# Patient Record
Sex: MALE | Race: WHITE | NOT HISPANIC OR LATINO | Employment: UNEMPLOYED | URBAN - METROPOLITAN AREA
[De-identification: names, ages, dates, MRNs, and addresses within clinical notes are randomized per-mention and may not be internally consistent; named-entity substitution may affect disease eponyms.]

---

## 2018-10-03 ENCOUNTER — HISTORICAL (OUTPATIENT)
Dept: INTERNAL MEDICINE | Facility: CLINIC | Age: 58
End: 2018-10-03

## 2018-10-03 LAB
ABS NEUT (OLG): 5.43 X10(3)/MCL (ref 2.1–9.2)
ALBUMIN SERPL-MCNC: 3.6 GM/DL (ref 3.4–5)
ALBUMIN/GLOB SERPL: 1 RATIO (ref 1–2)
ALP SERPL-CCNC: 57 UNIT/L (ref 45–117)
ALT SERPL-CCNC: 43 UNIT/L (ref 12–78)
APPEARANCE, UA: CLEAR
AST SERPL-CCNC: 35 UNIT/L (ref 15–37)
BACTERIA #/AREA URNS AUTO: ABNORMAL /[HPF]
BASOPHILS # BLD AUTO: 0.05 X10(3)/MCL
BASOPHILS NFR BLD AUTO: 0 %
BILIRUB SERPL-MCNC: 0.8 MG/DL (ref 0.2–1)
BILIRUB UR QL STRIP: NEGATIVE
BILIRUBIN DIRECT+TOT PNL SERPL-MCNC: 0.3 MG/DL
BILIRUBIN DIRECT+TOT PNL SERPL-MCNC: 0.5 MG/DL
BUN SERPL-MCNC: 31 MG/DL (ref 7–18)
CALCIUM SERPL-MCNC: 8.9 MG/DL (ref 8.5–10.1)
CHLORIDE SERPL-SCNC: 100 MMOL/L (ref 98–107)
CHOLEST SERPL-MCNC: 135 MG/DL
CHOLEST/HDLC SERPL: 4.7 {RATIO} (ref 0–5)
CO2 SERPL-SCNC: 23 MMOL/L (ref 21–32)
COLOR UR: YELLOW
CREAT SERPL-MCNC: 1.8 MG/DL (ref 0.6–1.3)
EOSINOPHIL # BLD AUTO: 0.27 X10(3)/MCL
EOSINOPHIL NFR BLD AUTO: 3 %
ERYTHROCYTE [DISTWIDTH] IN BLOOD BY AUTOMATED COUNT: 13.9 % (ref 11.5–14.5)
EST. AVERAGE GLUCOSE BLD GHB EST-MCNC: 105 MG/DL
GLOBULIN SER-MCNC: 5 GM/ML (ref 2.3–3.5)
GLUCOSE (UA): NORMAL
GLUCOSE SERPL-MCNC: 94 MG/DL (ref 74–106)
HAV IGM SERPL QL IA: NONREACTIVE
HBA1C MFR BLD: 5.3 % (ref 4.2–6.3)
HBV CORE IGM SERPL QL IA: NONREACTIVE
HBV SURFACE AG SERPL QL IA: NEGATIVE
HCT VFR BLD AUTO: 35.9 % (ref 40–51)
HCV AB SERPL QL IA: NONREACTIVE
HDLC SERPL-MCNC: 29 MG/DL
HGB BLD-MCNC: 12.5 GM/DL (ref 13.5–17.5)
HGB UR QL STRIP: NEGATIVE
HIV 1+2 AB+HIV1 P24 AG SERPL QL IA: NONREACTIVE
HYALINE CASTS #/AREA URNS LPF: ABNORMAL /[LPF]
IMM GRANULOCYTES # BLD AUTO: 0.13 10*3/UL
IMM GRANULOCYTES NFR BLD AUTO: 1 %
KETONES UR QL STRIP: NEGATIVE
LDLC SERPL CALC-MCNC: 64 MG/DL (ref 0–130)
LEUKOCYTE ESTERASE UR QL STRIP: 75 LEU/UL
LYMPHOCYTES # BLD AUTO: 2.63 X10(3)/MCL
LYMPHOCYTES NFR BLD AUTO: 27 % (ref 13–40)
MCH RBC QN AUTO: 31.4 PG (ref 26–34)
MCHC RBC AUTO-ENTMCNC: 34.8 GM/DL (ref 31–37)
MCV RBC AUTO: 90.2 FL (ref 80–100)
MONOCYTES # BLD AUTO: 1.2 X10(3)/MCL
MONOCYTES NFR BLD AUTO: 12 % (ref 4–12)
NEUTROPHILS # BLD AUTO: 5.43 X10(3)/MCL
NEUTROPHILS NFR BLD AUTO: 56 X10(3)/MCL
NITRITE UR QL STRIP: NEGATIVE
PH UR STRIP: 5.5 [PH] (ref 4.5–8)
PLATELET # BLD AUTO: 131 X10(3)/MCL (ref 130–400)
PMV BLD AUTO: 9.5 FL (ref 7.4–10.4)
POTASSIUM SERPL-SCNC: 4 MMOL/L (ref 3.5–5.1)
PROT SERPL-MCNC: 8.6 GM/DL (ref 6.4–8.2)
PROT UR QL STRIP: 10 MG/DL
PSA SERPL-MCNC: 0.6 NG/ML
RBC # BLD AUTO: 3.98 X10(6)/MCL (ref 4.5–5.9)
RBC #/AREA URNS AUTO: ABNORMAL /[HPF]
SODIUM SERPL-SCNC: 134 MMOL/L (ref 136–145)
SP GR UR STRIP: 1.01 (ref 1–1.03)
SQUAMOUS #/AREA URNS LPF: ABNORMAL /[LPF]
T PALLIDUM AB SER QL: NONREACTIVE
TRIGL SERPL-MCNC: 208 MG/DL
TSH SERPL-ACNC: 1.92 MIU/L (ref 0.36–3.74)
UROBILINOGEN UR STRIP-ACNC: NORMAL
VLDLC SERPL CALC-MCNC: 42 MG/DL
WBC # SPEC AUTO: 9.7 X10(3)/MCL (ref 4.5–11)
WBC #/AREA URNS AUTO: ABNORMAL /HPF

## 2018-10-10 ENCOUNTER — HISTORICAL (OUTPATIENT)
Dept: INTERNAL MEDICINE | Facility: CLINIC | Age: 58
End: 2018-10-10

## 2018-10-10 LAB
APPEARANCE, UA: CLEAR
BACTERIA #/AREA URNS AUTO: ABNORMAL /[HPF]
BILIRUB UR QL STRIP: NEGATIVE
BUN SERPL-MCNC: 22 MG/DL (ref 7–18)
CALCIUM SERPL-MCNC: 9.4 MG/DL (ref 8.5–10.1)
CHLORIDE SERPL-SCNC: 102 MMOL/L (ref 98–107)
CO2 SERPL-SCNC: 26 MMOL/L (ref 21–32)
COLOR UR: YELLOW
CREAT SERPL-MCNC: 0.9 MG/DL (ref 0.6–1.3)
CREAT/UREA NIT SERPL: 24
GLUCOSE (UA): NORMAL
GLUCOSE SERPL-MCNC: 89 MG/DL (ref 74–106)
HGB UR QL STRIP: NEGATIVE
HYALINE CASTS #/AREA URNS LPF: ABNORMAL /[LPF]
KETONES UR QL STRIP: NEGATIVE
LEUKOCYTE ESTERASE UR QL STRIP: 75 LEU/UL
NITRITE UR QL STRIP: NEGATIVE
PH UR STRIP: 6 [PH] (ref 4.5–8)
POTASSIUM SERPL-SCNC: 4 MMOL/L (ref 3.5–5.1)
PROT UR QL STRIP: 10 MG/DL
RBC #/AREA URNS AUTO: ABNORMAL /[HPF]
SODIUM SERPL-SCNC: 136 MMOL/L (ref 136–145)
SP GR UR STRIP: 1.02 (ref 1–1.03)
SQUAMOUS #/AREA URNS LPF: ABNORMAL /[LPF]
UROBILINOGEN UR STRIP-ACNC: NORMAL
WBC #/AREA URNS AUTO: ABNORMAL /HPF

## 2018-10-12 LAB — FINAL CULTURE: NO GROWTH

## 2018-10-26 ENCOUNTER — HISTORICAL (OUTPATIENT)
Dept: INTERNAL MEDICINE | Facility: CLINIC | Age: 58
End: 2018-10-26

## 2018-10-26 LAB
APPEARANCE, UA: CLEAR
BACTERIA #/AREA URNS AUTO: ABNORMAL /[HPF]
BILIRUB UR QL STRIP: NEGATIVE
COLOR UR: YELLOW
GLUCOSE (UA): NORMAL
HGB UR QL STRIP: NEGATIVE
HYALINE CASTS #/AREA URNS LPF: ABNORMAL /[LPF]
KETONES UR QL STRIP: NEGATIVE
LEUKOCYTE ESTERASE UR QL STRIP: 250 LEU/UL
NITRITE UR QL STRIP: NEGATIVE
PH UR STRIP: 6 [PH] (ref 4.5–8)
PROT UR QL STRIP: NEGATIVE
RBC #/AREA URNS AUTO: ABNORMAL /[HPF]
SP GR UR STRIP: 1.02 (ref 1–1.03)
SQUAMOUS #/AREA URNS LPF: ABNORMAL /[LPF]
UROBILINOGEN UR STRIP-ACNC: NORMAL
WBC #/AREA URNS AUTO: ABNORMAL /HPF

## 2018-12-04 ENCOUNTER — HISTORICAL (OUTPATIENT)
Dept: ADMINISTRATIVE | Facility: HOSPITAL | Age: 58
End: 2018-12-04

## 2019-09-27 ENCOUNTER — HISTORICAL (OUTPATIENT)
Dept: INTERNAL MEDICINE | Facility: CLINIC | Age: 59
End: 2019-09-27

## 2019-09-27 LAB
ABS NEUT (OLG): 3.29 X10(3)/MCL (ref 2.1–9.2)
ALBUMIN SERPL-MCNC: 3.9 GM/DL (ref 3.4–5)
ALBUMIN/GLOB SERPL: 0.8 RATIO (ref 1.1–2)
ALP SERPL-CCNC: 54 UNIT/L (ref 45–117)
ALT SERPL-CCNC: 50 UNIT/L (ref 12–78)
APPEARANCE, UA: CLEAR
AST SERPL-CCNC: 41 UNIT/L (ref 15–37)
BACTERIA #/AREA URNS AUTO: ABNORMAL /[HPF]
BASOPHILS # BLD AUTO: 0 X10(3)/MCL (ref 0–0.2)
BASOPHILS NFR BLD AUTO: 0 %
BILIRUB SERPL-MCNC: 0.4 MG/DL (ref 0.2–1)
BILIRUB UR QL STRIP: NEGATIVE
BILIRUBIN DIRECT+TOT PNL SERPL-MCNC: 0.2 MG/DL
BILIRUBIN DIRECT+TOT PNL SERPL-MCNC: 0.2 MG/DL (ref 0–0.2)
BUN SERPL-MCNC: 18 MG/DL (ref 7–18)
CALCIUM SERPL-MCNC: 8.8 MG/DL (ref 8.5–10.1)
CHLORIDE SERPL-SCNC: 109 MMOL/L (ref 98–107)
CHOLEST SERPL-MCNC: 183 MG/DL
CHOLEST/HDLC SERPL: 4.4 {RATIO} (ref 0–5)
CO2 SERPL-SCNC: 25 MMOL/L (ref 21–32)
COLOR UR: ABNORMAL
CREAT SERPL-MCNC: 0.7 MG/DL (ref 0.6–1.3)
EOSINOPHIL # BLD AUTO: 0.1 X10(3)/MCL (ref 0–0.9)
EOSINOPHIL NFR BLD AUTO: 2 %
ERYTHROCYTE [DISTWIDTH] IN BLOOD BY AUTOMATED COUNT: 13.2 % (ref 11.5–14.5)
EST. AVERAGE GLUCOSE BLD GHB EST-MCNC: 128 MG/DL
GLOBULIN SER-MCNC: 4.7 GM/ML (ref 2.3–3.5)
GLUCOSE (UA): NORMAL
GLUCOSE SERPL-MCNC: 92 MG/DL (ref 74–106)
HAV IGM SERPL QL IA: NONREACTIVE
HBA1C MFR BLD: 6.1 % (ref 4.2–6.3)
HBV CORE IGM SERPL QL IA: NONREACTIVE
HBV SURFACE AG SERPL QL IA: NEGATIVE
HCT VFR BLD AUTO: 40.4 % (ref 40–51)
HCV AB SERPL QL IA: NONREACTIVE
HDLC SERPL-MCNC: 42 MG/DL (ref 40–59)
HGB BLD-MCNC: 13.9 GM/DL (ref 13.5–17.5)
HGB UR QL STRIP: 0.03 MG/DL
HIV 1+2 AB+HIV1 P24 AG SERPL QL IA: NONREACTIVE
HYALINE CASTS #/AREA URNS LPF: ABNORMAL /[LPF]
IMM GRANULOCYTES # BLD AUTO: 0.09 10*3/UL
IMM GRANULOCYTES NFR BLD AUTO: 2 %
KETONES UR QL STRIP: NEGATIVE
LDLC SERPL CALC-MCNC: 119 MG/DL
LEUKOCYTE ESTERASE UR QL STRIP: 250 LEU/UL
LYMPHOCYTES # BLD AUTO: 1.9 X10(3)/MCL (ref 0.6–4.6)
LYMPHOCYTES NFR BLD AUTO: 31 %
MCH RBC QN AUTO: 32 PG (ref 26–34)
MCHC RBC AUTO-ENTMCNC: 34.4 GM/DL (ref 31–37)
MCV RBC AUTO: 93.1 FL (ref 80–100)
MONOCYTES # BLD AUTO: 0.6 X10(3)/MCL (ref 0.1–1.3)
MONOCYTES NFR BLD AUTO: 10 %
NEUTROPHILS # BLD AUTO: 3.29 X10(3)/MCL (ref 2.1–9.2)
NEUTROPHILS NFR BLD AUTO: 54 %
NITRITE UR QL STRIP: NEGATIVE
PH UR STRIP: 5.5 [PH] (ref 4.5–8)
PLATELET # BLD AUTO: 119 X10(3)/MCL (ref 130–400)
PMV BLD AUTO: 9.1 FL (ref 7.4–10.4)
POTASSIUM SERPL-SCNC: 4.1 MMOL/L (ref 3.5–5.1)
PROT SERPL-MCNC: 8.6 GM/DL (ref 6.4–8.2)
PROT UR QL STRIP: 10 MG/DL
PSA SERPL-MCNC: 0.7 NG/ML
RBC # BLD AUTO: 4.34 X10(6)/MCL (ref 4.5–5.9)
RBC #/AREA URNS AUTO: ABNORMAL /[HPF]
SODIUM SERPL-SCNC: 139 MMOL/L (ref 136–145)
SP GR UR STRIP: 1.02 (ref 1–1.03)
SQUAMOUS #/AREA URNS LPF: ABNORMAL /[LPF]
T PALLIDUM AB SER QL: NONREACTIVE
TRIGL SERPL-MCNC: 111 MG/DL
TSH SERPL-ACNC: 1.07 MIU/L (ref 0.36–3.74)
UROBILINOGEN UR STRIP-ACNC: NORMAL
VLDLC SERPL CALC-MCNC: 22 MG/DL
WBC # SPEC AUTO: 6 X10(3)/MCL (ref 4.5–11)
WBC #/AREA URNS AUTO: ABNORMAL /HPF

## 2019-09-29 LAB — FINAL CULTURE: NO GROWTH

## 2019-10-16 ENCOUNTER — HISTORICAL (OUTPATIENT)
Dept: RADIOLOGY | Facility: HOSPITAL | Age: 59
End: 2019-10-16

## 2019-11-01 ENCOUNTER — HISTORICAL (OUTPATIENT)
Dept: ENDOSCOPY | Facility: HOSPITAL | Age: 59
End: 2019-11-01

## 2020-01-08 ENCOUNTER — HISTORICAL (OUTPATIENT)
Dept: ADMINISTRATIVE | Facility: HOSPITAL | Age: 60
End: 2020-01-08

## 2020-01-08 LAB
ABS NEUT (OLG): 5.07 X10(3)/MCL (ref 2.1–9.2)
ALBUMIN SERPL-MCNC: 4 GM/DL (ref 3.4–5)
ALBUMIN/GLOB SERPL: 0.8 RATIO (ref 1.1–2)
ALP SERPL-CCNC: 43 UNIT/L (ref 45–117)
ALT SERPL-CCNC: 61 UNIT/L (ref 12–78)
APTT PPP: 31 SECOND(S) (ref 23.3–37)
AST SERPL-CCNC: 36 UNIT/L (ref 15–37)
BASOPHILS # BLD AUTO: 0 X10(3)/MCL (ref 0–0.2)
BASOPHILS NFR BLD AUTO: 0 %
BILIRUB SERPL-MCNC: 0.6 MG/DL (ref 0.2–1)
BILIRUBIN DIRECT+TOT PNL SERPL-MCNC: 0.2 MG/DL (ref 0–0.2)
BILIRUBIN DIRECT+TOT PNL SERPL-MCNC: 0.4 MG/DL
BUN SERPL-MCNC: 25 MG/DL (ref 7–18)
CALCIUM SERPL-MCNC: 9.6 MG/DL (ref 8.5–10.1)
CHLORIDE SERPL-SCNC: 103 MMOL/L (ref 98–107)
CO2 SERPL-SCNC: 26 MMOL/L (ref 21–32)
CREAT SERPL-MCNC: 0.8 MG/DL (ref 0.6–1.3)
EOSINOPHIL # BLD AUTO: 0.2 X10(3)/MCL (ref 0–0.9)
EOSINOPHIL NFR BLD AUTO: 2 %
ERYTHROCYTE [DISTWIDTH] IN BLOOD BY AUTOMATED COUNT: 12.7 % (ref 11.5–14.5)
GLOBULIN SER-MCNC: 4.8 GM/ML (ref 2.3–3.5)
GLUCOSE SERPL-MCNC: 99 MG/DL (ref 74–106)
HCT VFR BLD AUTO: 45.8 % (ref 40–51)
HGB BLD-MCNC: 15.4 GM/DL (ref 13.5–17.5)
IMM GRANULOCYTES # BLD AUTO: 0.1 10*3/UL
IMM GRANULOCYTES NFR BLD AUTO: 1 %
INR PPP: 1.01 (ref 0.9–1.2)
LYMPHOCYTES # BLD AUTO: 2.5 X10(3)/MCL (ref 0.6–4.6)
LYMPHOCYTES NFR BLD AUTO: 28 %
MCH RBC QN AUTO: 31.5 PG (ref 26–34)
MCHC RBC AUTO-ENTMCNC: 33.6 GM/DL (ref 31–37)
MCV RBC AUTO: 93.7 FL (ref 80–100)
MONOCYTES # BLD AUTO: 0.8 X10(3)/MCL (ref 0.1–1.3)
MONOCYTES NFR BLD AUTO: 10 %
NEUTROPHILS # BLD AUTO: 5.07 X10(3)/MCL (ref 2.1–9.2)
NEUTROPHILS NFR BLD AUTO: 59 %
PLATELET # BLD AUTO: 112 X10(3)/MCL (ref 130–400)
PMV BLD AUTO: 9.4 FL (ref 7.4–10.4)
POTASSIUM SERPL-SCNC: 3.9 MMOL/L (ref 3.5–5.1)
PROT SERPL-MCNC: 8.8 GM/DL (ref 6.4–8.2)
PROTHROMBIN TIME: 13.2 SECOND(S) (ref 11.9–14.4)
RBC # BLD AUTO: 4.89 X10(6)/MCL (ref 4.5–5.9)
SODIUM SERPL-SCNC: 138 MMOL/L (ref 136–145)
WBC # SPEC AUTO: 8.6 X10(3)/MCL (ref 4.5–11)

## 2020-01-24 ENCOUNTER — HISTORICAL (OUTPATIENT)
Dept: RADIOLOGY | Facility: HOSPITAL | Age: 60
End: 2020-01-24

## 2020-02-18 ENCOUNTER — HISTORICAL (OUTPATIENT)
Dept: ADMINISTRATIVE | Facility: HOSPITAL | Age: 60
End: 2020-02-18

## 2020-02-18 LAB
FERRITIN SERPL-MCNC: 1352.4 NG/ML (ref 26–388)
IRON SATN MFR SERPL: 30.6 % (ref 15–50)
IRON SERPL-MCNC: 87 MCG/DL (ref 65–175)
TIBC SERPL-MCNC: 284 MCG/DL (ref 250–450)
TRANSFERRIN SERPL-MCNC: 245 MG/DL (ref 200–360)

## 2020-02-20 ENCOUNTER — HISTORICAL (OUTPATIENT)
Dept: SURGERY | Facility: HOSPITAL | Age: 60
End: 2020-02-20

## 2020-02-20 LAB
ABS NEUT (OLG): 4.3 X10(3)/MCL (ref 2.1–9.2)
BASOPHILS # BLD AUTO: 0 X10(3)/MCL (ref 0–0.2)
BASOPHILS NFR BLD AUTO: 0 %
EOSINOPHIL # BLD AUTO: 0.1 X10(3)/MCL (ref 0–0.9)
EOSINOPHIL NFR BLD AUTO: 2 %
ERYTHROCYTE [DISTWIDTH] IN BLOOD BY AUTOMATED COUNT: 12.6 % (ref 11.5–14.5)
HCT VFR BLD AUTO: 44.1 % (ref 40–51)
HGB BLD-MCNC: 15.1 GM/DL (ref 13.5–17.5)
IMM GRANULOCYTES # BLD AUTO: 0.05 10*3/UL
IMM GRANULOCYTES NFR BLD AUTO: 1 %
INR PPP: 0.97 (ref 0.9–1.2)
LYMPHOCYTES # BLD AUTO: 1.7 X10(3)/MCL (ref 0.6–4.6)
LYMPHOCYTES NFR BLD AUTO: 24 %
MCH RBC QN AUTO: 31.5 PG (ref 26–34)
MCHC RBC AUTO-ENTMCNC: 34.2 GM/DL (ref 31–37)
MCV RBC AUTO: 91.9 FL (ref 80–100)
MONOCYTES # BLD AUTO: 0.7 X10(3)/MCL (ref 0.1–1.3)
MONOCYTES NFR BLD AUTO: 11 %
NEUTROPHILS # BLD AUTO: 4.3 X10(3)/MCL (ref 2.1–9.2)
NEUTROPHILS NFR BLD AUTO: 62 %
PLATELET # BLD AUTO: 107 X10(3)/MCL (ref 130–400)
PMV BLD AUTO: 9.4 FL (ref 7.4–10.4)
PROTHROMBIN TIME: 12.8 SECOND(S) (ref 11.9–14.4)
RBC # BLD AUTO: 4.8 X10(6)/MCL (ref 4.5–5.9)
WBC # SPEC AUTO: 6.9 X10(3)/MCL (ref 4.5–11)

## 2020-07-29 ENCOUNTER — HISTORICAL (OUTPATIENT)
Dept: ADMINISTRATIVE | Facility: HOSPITAL | Age: 60
End: 2020-07-29

## 2020-07-29 LAB
ABS NEUT (OLG): 5.9 X10(3)/MCL (ref 2.1–9.2)
ALBUMIN SERPL-MCNC: 4 GM/DL (ref 3.4–5)
ALBUMIN/GLOB SERPL: 0.8 RATIO (ref 1.1–2)
ALP SERPL-CCNC: 49 UNIT/L (ref 45–117)
ALT SERPL-CCNC: 45 UNIT/L (ref 12–78)
APTT PPP: 28.6 SECOND(S) (ref 23.3–37)
AST SERPL-CCNC: 31 UNIT/L (ref 15–37)
BASOPHILS # BLD AUTO: 0.1 X10(3)/MCL (ref 0–0.2)
BASOPHILS NFR BLD AUTO: 1 %
BILIRUB SERPL-MCNC: 0.6 MG/DL (ref 0.2–1)
BILIRUBIN DIRECT+TOT PNL SERPL-MCNC: 0.2 MG/DL (ref 0–0.2)
BILIRUBIN DIRECT+TOT PNL SERPL-MCNC: 0.4 MG/DL
BUN SERPL-MCNC: 21 MG/DL (ref 7–18)
CALCIUM SERPL-MCNC: 10.1 MG/DL (ref 8.5–10.1)
CHLORIDE SERPL-SCNC: 105 MMOL/L (ref 98–107)
CO2 SERPL-SCNC: 27 MMOL/L (ref 21–32)
CREAT SERPL-MCNC: 0.7 MG/DL (ref 0.6–1.3)
EOSINOPHIL # BLD AUTO: 0.2 X10(3)/MCL (ref 0–0.9)
EOSINOPHIL NFR BLD AUTO: 2 %
ERYTHROCYTE [DISTWIDTH] IN BLOOD BY AUTOMATED COUNT: 14.1 % (ref 11.5–14.5)
GLOBULIN SER-MCNC: 5.1 GM/ML (ref 2.3–3.5)
GLUCOSE SERPL-MCNC: 96 MG/DL (ref 74–106)
HBV CORE AB SERPL QL IA: NONREACTIVE
HBV SURFACE AB SER-ACNC: 0 M[IU]/ML
HBV SURFACE AB SERPL IA-ACNC: NONREACTIVE M[IU]/ML
HCT VFR BLD AUTO: 48.5 % (ref 40–51)
HGB BLD-MCNC: 16.4 GM/DL (ref 13.5–17.5)
IMM GRANULOCYTES # BLD AUTO: 0.24 10*3/UL
IMM GRANULOCYTES NFR BLD AUTO: 2 %
INR PPP: 1.03 (ref 0.9–1.2)
LYMPHOCYTES # BLD AUTO: 2.4 X10(3)/MCL (ref 0.6–4.6)
LYMPHOCYTES NFR BLD AUTO: 25 %
MCH RBC QN AUTO: 31.1 PG (ref 26–34)
MCHC RBC AUTO-ENTMCNC: 33.8 GM/DL (ref 31–37)
MCV RBC AUTO: 92 FL (ref 80–100)
MONOCYTES # BLD AUTO: 1 X10(3)/MCL (ref 0.1–1.3)
MONOCYTES NFR BLD AUTO: 11 %
NEUTROPHILS # BLD AUTO: 5.9 X10(3)/MCL (ref 2.1–9.2)
NEUTROPHILS NFR BLD AUTO: 60 %
PLATELET # BLD AUTO: 146 X10(3)/MCL (ref 130–400)
PMV BLD AUTO: 9.4 FL (ref 7.4–10.4)
POTASSIUM SERPL-SCNC: 3.9 MMOL/L (ref 3.5–5.1)
PROT SERPL-MCNC: 9.1 GM/DL (ref 6.4–8.2)
PROTHROMBIN TIME: 13.1 SECOND(S) (ref 11.9–14.4)
RBC # BLD AUTO: 5.27 X10(6)/MCL (ref 4.5–5.9)
SODIUM SERPL-SCNC: 138 MMOL/L (ref 136–145)
WBC # SPEC AUTO: 9.9 X10(3)/MCL (ref 4.5–11)

## 2020-08-24 ENCOUNTER — HISTORICAL (OUTPATIENT)
Dept: RADIOLOGY | Facility: HOSPITAL | Age: 60
End: 2020-08-24

## 2020-10-06 ENCOUNTER — HISTORICAL (OUTPATIENT)
Dept: INTERNAL MEDICINE | Facility: CLINIC | Age: 60
End: 2020-10-06

## 2020-10-06 LAB
ABS NEUT (OLG): 5.2 X10(3)/MCL (ref 2.1–9.2)
ALBUMIN SERPL-MCNC: 3.8 GM/DL (ref 3.4–5)
ALBUMIN/GLOB SERPL: 0.8 RATIO (ref 1.1–2)
ALP SERPL-CCNC: 62 UNIT/L (ref 45–117)
ALT SERPL-CCNC: 51 UNIT/L (ref 12–78)
APPEARANCE, UA: CLEAR
AST SERPL-CCNC: 29 UNIT/L (ref 15–37)
BACTERIA #/AREA URNS AUTO: ABNORMAL /HPF
BASOPHILS # BLD AUTO: 0 X10(3)/MCL (ref 0–0.2)
BASOPHILS NFR BLD AUTO: 0 %
BILIRUB SERPL-MCNC: 0.5 MG/DL (ref 0.2–1)
BILIRUB UR QL STRIP: NEGATIVE
BILIRUBIN DIRECT+TOT PNL SERPL-MCNC: 0.2 MG/DL (ref 0–0.2)
BILIRUBIN DIRECT+TOT PNL SERPL-MCNC: 0.3 MG/DL
BUN SERPL-MCNC: 18 MG/DL (ref 7–18)
CALCIUM SERPL-MCNC: 9.2 MG/DL (ref 8.5–10.1)
CHLORIDE SERPL-SCNC: 104 MMOL/L (ref 98–107)
CHOLEST SERPL-MCNC: 178 MG/DL
CHOLEST/HDLC SERPL: 4.3 {RATIO} (ref 0–5)
CO2 SERPL-SCNC: 28 MMOL/L (ref 21–32)
COLOR UR: ABNORMAL
CREAT SERPL-MCNC: 0.8 MG/DL (ref 0.6–1.3)
EOSINOPHIL # BLD AUTO: 0.2 X10(3)/MCL (ref 0–0.9)
EOSINOPHIL NFR BLD AUTO: 2 %
ERYTHROCYTE [DISTWIDTH] IN BLOOD BY AUTOMATED COUNT: 13.3 % (ref 11.5–14.5)
EST. AVERAGE GLUCOSE BLD GHB EST-MCNC: 103 MG/DL
GLOBULIN SER-MCNC: 4.7 GM/ML (ref 2.3–3.5)
GLUCOSE (UA): NEGATIVE
GLUCOSE SERPL-MCNC: 94 MG/DL (ref 74–106)
HBA1C MFR BLD: 5.2 % (ref 4.2–6.3)
HCT VFR BLD AUTO: 44.3 % (ref 40–51)
HDLC SERPL-MCNC: 41 MG/DL (ref 40–59)
HGB BLD-MCNC: 15.2 GM/DL (ref 13.5–17.5)
HGB UR QL STRIP: NEGATIVE
HYALINE CASTS #/AREA URNS LPF: ABNORMAL /LPF
IMM GRANULOCYTES # BLD AUTO: 0.11 10*3/UL
IMM GRANULOCYTES NFR BLD AUTO: 1 %
KETONES UR QL STRIP: NEGATIVE
LDLC SERPL CALC-MCNC: 93 MG/DL
LEUKOCYTE ESTERASE UR QL STRIP: 75 LEU/UL
LYMPHOCYTES # BLD AUTO: 1.8 X10(3)/MCL (ref 0.6–4.6)
LYMPHOCYTES NFR BLD AUTO: 21 %
MCH RBC QN AUTO: 31.8 PG (ref 26–34)
MCHC RBC AUTO-ENTMCNC: 34.3 GM/DL (ref 31–37)
MCV RBC AUTO: 92.7 FL (ref 80–100)
MONOCYTES # BLD AUTO: 1 X10(3)/MCL (ref 0.1–1.3)
MONOCYTES NFR BLD AUTO: 12 %
NEUTROPHILS # BLD AUTO: 5.2 X10(3)/MCL (ref 2.1–9.2)
NEUTROPHILS NFR BLD AUTO: 63 %
NITRITE UR QL STRIP: NEGATIVE
PH UR STRIP: 6 [PH] (ref 4.5–8)
PLATELET # BLD AUTO: 134 X10(3)/MCL (ref 130–400)
PMV BLD AUTO: 9.6 FL (ref 7.4–10.4)
POTASSIUM SERPL-SCNC: 4 MMOL/L (ref 3.5–5.1)
PROT SERPL-MCNC: 8.5 GM/DL (ref 6.4–8.2)
PROT UR QL STRIP: 10 MG/DL
PSA SERPL-MCNC: 0.5 NG/ML
RBC # BLD AUTO: 4.78 X10(6)/MCL (ref 4.5–5.9)
RBC #/AREA URNS AUTO: ABNORMAL /HPF
SODIUM SERPL-SCNC: 139 MMOL/L (ref 136–145)
SP GR UR STRIP: 1.02 (ref 1–1.03)
SQUAMOUS #/AREA URNS LPF: ABNORMAL /LPF
TRIGL SERPL-MCNC: 219 MG/DL
TSH SERPL-ACNC: 2.3 MIU/L (ref 0.36–3.74)
UROBILINOGEN UR STRIP-ACNC: NORMAL
VLDLC SERPL CALC-MCNC: 44 MG/DL
WBC # SPEC AUTO: 8.2 X10(3)/MCL (ref 4.5–11)
WBC #/AREA URNS AUTO: ABNORMAL /HPF

## 2020-10-08 LAB — FINAL CULTURE: NO GROWTH

## 2021-01-12 ENCOUNTER — HISTORICAL (OUTPATIENT)
Dept: RADIOLOGY | Facility: HOSPITAL | Age: 61
End: 2021-01-12

## 2021-01-21 ENCOUNTER — HISTORICAL (OUTPATIENT)
Dept: GASTROENTEROLOGY | Facility: CLINIC | Age: 61
End: 2021-01-21

## 2021-04-14 ENCOUNTER — HISTORICAL (OUTPATIENT)
Dept: INTERNAL MEDICINE | Facility: CLINIC | Age: 61
End: 2021-04-14

## 2021-04-14 LAB
ALBUMIN SERPL-MCNC: 4 GM/DL (ref 3.4–4.8)
ALBUMIN/GLOB SERPL: 0.8 RATIO (ref 1.1–2)
ALP SERPL-CCNC: 70 UNIT/L (ref 40–150)
ALT SERPL-CCNC: 61 UNIT/L (ref 0–55)
AST SERPL-CCNC: 34 UNIT/L (ref 5–34)
BILIRUB SERPL-MCNC: 0.5 MG/DL
BILIRUBIN DIRECT+TOT PNL SERPL-MCNC: 0.2 MG/DL (ref 0–0.8)
BILIRUBIN DIRECT+TOT PNL SERPL-MCNC: 0.3 MG/DL (ref 0–0.5)
BUN SERPL-MCNC: 19.8 MG/DL (ref 8.4–25.7)
CALCIUM SERPL-MCNC: 9.9 MG/DL (ref 8.8–10)
CHLORIDE SERPL-SCNC: 104 MMOL/L (ref 98–107)
CHOLEST SERPL-MCNC: 174 MG/DL
CHOLEST/HDLC SERPL: 6 {RATIO} (ref 0–5)
CO2 SERPL-SCNC: 24 MMOL/L (ref 23–31)
CREAT SERPL-MCNC: 0.79 MG/DL (ref 0.73–1.18)
GLOBULIN SER-MCNC: 5.3 GM/DL (ref 2.4–3.5)
GLUCOSE SERPL-MCNC: 95 MG/DL (ref 82–115)
HDLC SERPL-MCNC: 31 MG/DL (ref 35–60)
LDLC SERPL CALC-MCNC: 99 MG/DL (ref 50–140)
POTASSIUM SERPL-SCNC: 4 MMOL/L (ref 3.5–5.1)
PROT SERPL-MCNC: 9.3 GM/DL (ref 5.8–7.6)
SODIUM SERPL-SCNC: 138 MMOL/L (ref 136–145)
TRIGL SERPL-MCNC: 219 MG/DL (ref 34–140)
VLDLC SERPL CALC-MCNC: 44 MG/DL

## 2021-06-16 ENCOUNTER — HISTORICAL (OUTPATIENT)
Dept: ENDOSCOPY | Facility: HOSPITAL | Age: 61
End: 2021-06-16

## 2021-06-29 ENCOUNTER — HISTORICAL (OUTPATIENT)
Dept: RADIOLOGY | Facility: HOSPITAL | Age: 61
End: 2021-06-29

## 2021-10-14 ENCOUNTER — HISTORICAL (OUTPATIENT)
Dept: INTERNAL MEDICINE | Facility: CLINIC | Age: 61
End: 2021-10-14

## 2021-10-14 LAB
ABS NEUT (OLG): 3.8 X10(3)/MCL (ref 2.1–9.2)
ALBUMIN SERPL-MCNC: 3.8 GM/DL (ref 3.4–4.8)
ALBUMIN/GLOB SERPL: 0.9 RATIO (ref 1.1–2)
ALP SERPL-CCNC: 61 UNIT/L (ref 40–150)
ALT SERPL-CCNC: 40 UNIT/L (ref 0–55)
APPEARANCE, UA: CLEAR
AST SERPL-CCNC: 32 UNIT/L (ref 5–34)
BACTERIA #/AREA URNS AUTO: ABNORMAL /HPF
BASOPHILS # BLD AUTO: 0.1 X10(3)/MCL (ref 0–0.2)
BASOPHILS NFR BLD AUTO: 1 %
BILIRUB SERPL-MCNC: 0.4 MG/DL
BILIRUB UR QL STRIP: NEGATIVE
BILIRUBIN DIRECT+TOT PNL SERPL-MCNC: 0.1 MG/DL (ref 0–0.5)
BILIRUBIN DIRECT+TOT PNL SERPL-MCNC: 0.3 MG/DL (ref 0–0.8)
BUN SERPL-MCNC: 18.5 MG/DL (ref 8.4–25.7)
CALCIUM SERPL-MCNC: 9.7 MG/DL (ref 8.8–10)
CHLORIDE SERPL-SCNC: 106 MMOL/L (ref 98–107)
CHOLEST SERPL-MCNC: 193 MG/DL
CHOLEST/HDLC SERPL: 7 {RATIO} (ref 0–5)
CO2 SERPL-SCNC: 23 MMOL/L (ref 23–31)
COLOR UR: ABNORMAL
CREAT SERPL-MCNC: 0.73 MG/DL (ref 0.73–1.18)
EOSINOPHIL # BLD AUTO: 1 X10(3)/MCL (ref 0–0.9)
EOSINOPHIL NFR BLD AUTO: 12 %
ERYTHROCYTE [DISTWIDTH] IN BLOOD BY AUTOMATED COUNT: 13.1 % (ref 11.5–14.5)
EST. AVERAGE GLUCOSE BLD GHB EST-MCNC: 93.9 MG/DL
GLOBULIN SER-MCNC: 4.2 GM/DL (ref 2.4–3.5)
GLUCOSE (UA): NEGATIVE
GLUCOSE SERPL-MCNC: 107 MG/DL (ref 82–115)
HBA1C MFR BLD: 4.9 %
HCT VFR BLD AUTO: 45.5 % (ref 40–51)
HDLC SERPL-MCNC: 29 MG/DL (ref 35–60)
HGB BLD-MCNC: 15.7 GM/DL (ref 13.5–17.5)
HGB UR QL STRIP: 0.03 MG/DL
HYALINE CASTS #/AREA URNS LPF: ABNORMAL /LPF
IMM GRANULOCYTES # BLD AUTO: 0.14 10*3/UL
IMM GRANULOCYTES NFR BLD AUTO: 2 %
KETONES UR QL STRIP: NEGATIVE
LDLC SERPL CALC-MCNC: 102 MG/DL (ref 50–140)
LEUKOCYTE ESTERASE UR QL STRIP: 250 LEU/UL
LYMPHOCYTES # BLD AUTO: 2.4 X10(3)/MCL (ref 0.6–4.6)
LYMPHOCYTES NFR BLD AUTO: 29 %
MCH RBC QN AUTO: 32.2 PG (ref 26–34)
MCHC RBC AUTO-ENTMCNC: 34.5 GM/DL (ref 31–37)
MCV RBC AUTO: 93.4 FL (ref 80–100)
MONOCYTES # BLD AUTO: 0.9 X10(3)/MCL (ref 0.1–1.3)
MONOCYTES NFR BLD AUTO: 11 %
NEUTROPHILS # BLD AUTO: 3.8 X10(3)/MCL (ref 2.1–9.2)
NEUTROPHILS NFR BLD AUTO: 46 %
NITRITE UR QL STRIP: NEGATIVE
NRBC BLD AUTO-RTO: 0 % (ref 0–0.2)
PH UR STRIP: 6.5 [PH] (ref 4.5–8)
PLATELET # BLD AUTO: 127 X10(3)/MCL (ref 130–400)
PMV BLD AUTO: 9.3 FL (ref 7.4–10.4)
POTASSIUM SERPL-SCNC: 3.7 MMOL/L (ref 3.5–5.1)
PROT SERPL-MCNC: 8 GM/DL (ref 5.8–7.6)
PROT UR QL STRIP: NEGATIVE
PSA SERPL-MCNC: 0.68 NG/ML
RBC # BLD AUTO: 4.87 X10(6)/MCL (ref 4.5–5.9)
RBC #/AREA URNS AUTO: ABNORMAL /HPF
SODIUM SERPL-SCNC: 137 MMOL/L (ref 136–145)
SP GR UR STRIP: 1.01 (ref 1–1.03)
SQUAMOUS #/AREA URNS LPF: ABNORMAL /LPF
TRIGL SERPL-MCNC: 311 MG/DL (ref 34–140)
TSH SERPL-ACNC: 2.36 UIU/ML (ref 0.35–4.94)
UROBILINOGEN UR STRIP-ACNC: NORMAL
VLDLC SERPL CALC-MCNC: 62 MG/DL
WBC # SPEC AUTO: 8.2 X10(3)/MCL (ref 4.5–11)
WBC #/AREA URNS AUTO: ABNORMAL /HPF

## 2021-10-16 LAB — FINAL CULTURE: NO GROWTH

## 2021-11-22 ENCOUNTER — HISTORICAL (OUTPATIENT)
Dept: ADMINISTRATIVE | Facility: HOSPITAL | Age: 61
End: 2021-11-22

## 2021-11-22 LAB
INR PPP: 1.06 (ref 0.9–1.2)
PROTHROMBIN TIME: 13.7 SECOND(S) (ref 11.9–14.4)

## 2022-02-23 ENCOUNTER — HISTORICAL (OUTPATIENT)
Dept: ADMINISTRATIVE | Facility: HOSPITAL | Age: 62
End: 2022-02-23

## 2022-02-23 ENCOUNTER — HISTORICAL (OUTPATIENT)
Dept: RADIOLOGY | Facility: HOSPITAL | Age: 62
End: 2022-02-23

## 2022-03-17 ENCOUNTER — HISTORICAL (OUTPATIENT)
Dept: ADMINISTRATIVE | Facility: HOSPITAL | Age: 62
End: 2022-03-17

## 2022-03-17 ENCOUNTER — HISTORICAL (OUTPATIENT)
Dept: RADIOLOGY | Facility: HOSPITAL | Age: 62
End: 2022-03-17

## 2022-03-31 ENCOUNTER — HISTORICAL (OUTPATIENT)
Dept: INTERNAL MEDICINE | Facility: CLINIC | Age: 62
End: 2022-03-31

## 2022-03-31 LAB
ABS NEUT (OLG): 5.56 (ref 2.1–9.2)
ALBUMIN SERPL-MCNC: 4 G/DL (ref 3.4–4.8)
ALBUMIN/GLOB SERPL: 0.9 {RATIO} (ref 1.1–2)
ALP SERPL-CCNC: 44 U/L (ref 40–150)
ALT SERPL-CCNC: 33 U/L (ref 0–55)
AST SERPL-CCNC: 23 U/L (ref 5–34)
BASOPHILS # BLD AUTO: 0 10*3/UL (ref 0–0.2)
BASOPHILS NFR BLD AUTO: 0 %
BILIRUB SERPL-MCNC: 0.7 MG/DL
BILIRUBIN DIRECT+TOT PNL SERPL-MCNC: 0.3 (ref 0–0.5)
BILIRUBIN DIRECT+TOT PNL SERPL-MCNC: 0.4 (ref 0–0.8)
BUN SERPL-MCNC: 16.7 MG/DL (ref 8.4–25.7)
CALCIUM SERPL-MCNC: 10.2 MG/DL (ref 8.7–10.5)
CHLORIDE SERPL-SCNC: 106 MMOL/L (ref 98–107)
CHOLEST SERPL-MCNC: 177 MG/DL
CHOLEST/HDLC SERPL: 6 {RATIO} (ref 0–5)
CO2 SERPL-SCNC: 23 MMOL/L (ref 23–31)
CREAT SERPL-MCNC: 0.72 MG/DL (ref 0.73–1.18)
EOSINOPHIL # BLD AUTO: 0.3 10*3/UL (ref 0–0.9)
EOSINOPHIL NFR BLD AUTO: 3 %
ERYTHROCYTE [DISTWIDTH] IN BLOOD BY AUTOMATED COUNT: 14.1 % (ref 11.5–14.5)
FLAG2 (OHS): 70
FLAG3 (OHS): 90
FLAGS (OHS): 80
GLOBULIN SER-MCNC: 4.6 G/DL (ref 2.4–3.5)
GLUCOSE SERPL-MCNC: 106 MG/DL (ref 82–115)
HCT VFR BLD AUTO: 45.7 % (ref 40–51)
HDLC SERPL-MCNC: 30 MG/DL (ref 35–60)
HGB BLD-MCNC: 15.6 G/DL (ref 13.5–17.5)
ICTERIC INTERF INDEX SERPL-ACNC: 1
IMM GRANULOCYTES # BLD AUTO: 0.16 10*3/UL
IMM GRANULOCYTES NFR BLD AUTO: 2 %
IMM. NE 1 SUSPECT FLAG (OHS): 10
LDLC SERPL CALC-MCNC: 117 MG/DL (ref 50–140)
LIPEMIC INTERF INDEX SERPL-ACNC: 6
LOW EVENT # SUSPECT FLAG (OHS): 80
LYMPHOCYTES # BLD AUTO: 2.5 10*3/UL (ref 0.6–4.6)
LYMPHOCYTES NFR BLD AUTO: 27 %
MANUAL DIFF? (OHS): NO
MCH RBC QN AUTO: 31.2 PG (ref 26–34)
MCHC RBC AUTO-ENTMCNC: 34.1 G/DL (ref 31–37)
MCV RBC AUTO: 91.4 FL (ref 80–100)
MO BLASTS SUSPECT FLAG (OHS): 40
MONOCYTES # BLD AUTO: 0.8 10*3/UL (ref 0.1–1.3)
MONOCYTES NFR BLD AUTO: 9 %
NEUTROPHILS # BLD AUTO: 5.56 10*3/UL (ref 2.1–9.2)
NEUTROPHILS NFR BLD AUTO: 59 %
NRBC BLD AUTO-RTO: 0 % (ref 0–0.2)
PLATELET # BLD AUTO: 128 10*3/UL (ref 130–400)
PMV BLD AUTO: 9.8 FL (ref 7.4–10.4)
POTASSIUM SERPL-SCNC: 3.9 MMOL/L (ref 3.5–5.1)
PROT SERPL-MCNC: 8.6 G/DL (ref 5.8–7.6)
RBC # BLD AUTO: 5 10*6/UL (ref 4.5–5.9)
SODIUM SERPL-SCNC: 138 MMOL/L (ref 136–145)
TRIGL SERPL-MCNC: 150 MG/DL (ref 34–140)
VLDLC SERPL CALC-MCNC: 30 MG/DL
WBC # SPEC AUTO: 9.4 10*3/UL (ref 4.5–11)

## 2022-04-11 ENCOUNTER — HISTORICAL (OUTPATIENT)
Dept: ADMINISTRATIVE | Facility: HOSPITAL | Age: 62
End: 2022-04-11

## 2022-04-25 VITALS
HEIGHT: 67 IN | BODY MASS INDEX: 33.19 KG/M2 | OXYGEN SATURATION: 98 % | SYSTOLIC BLOOD PRESSURE: 122 MMHG | DIASTOLIC BLOOD PRESSURE: 82 MMHG | WEIGHT: 211.44 LBS

## 2022-04-29 ENCOUNTER — HISTORICAL (OUTPATIENT)
Dept: RADIOLOGY | Facility: HOSPITAL | Age: 62
End: 2022-04-29

## 2022-05-03 ENCOUNTER — OFFICE VISIT (OUTPATIENT)
Dept: VASCULAR SURGERY | Facility: CLINIC | Age: 62
End: 2022-05-03
Payer: MEDICAID

## 2022-05-03 VITALS
TEMPERATURE: 98 F | DIASTOLIC BLOOD PRESSURE: 88 MMHG | WEIGHT: 209.69 LBS | HEIGHT: 67 IN | HEART RATE: 94 BPM | SYSTOLIC BLOOD PRESSURE: 142 MMHG | OXYGEN SATURATION: 98 % | BODY MASS INDEX: 32.91 KG/M2 | RESPIRATION RATE: 18 BRPM

## 2022-05-03 DIAGNOSIS — I72.2 RENAL ARTERY ANEURYSM: Primary | ICD-10-CM

## 2022-05-03 DIAGNOSIS — F17.200 NEEDS SMOKING CESSATION EDUCATION: ICD-10-CM

## 2022-05-03 PROCEDURE — 99213 OFFICE O/P EST LOW 20 MIN: CPT | Mod: PBBFAC

## 2022-05-03 RX ORDER — LISINOPRIL AND HYDROCHLOROTHIAZIDE 20; 25 MG/1; MG/1
1 TABLET ORAL DAILY
COMMUNITY
Start: 2021-10-14 | End: 2022-09-26 | Stop reason: SDUPTHER

## 2022-05-03 RX ORDER — MULTIVITAMIN WITH IRON
1 TABLET ORAL DAILY
COMMUNITY
Start: 2021-10-14

## 2022-05-03 NOTE — PROGRESS NOTES
[facility]  Follow Up Appointment    62 yo M who presents for routine surveillance of a L renal artery aneurysm. Aneurysm found incidentally on a CT performed in 2019 performed for evaluation hematuria. Has always been asymptomatic. Patient is still smoking, but has been able to cut back to 10 cigarettes daily.     BP: (!) 142/88   Pulse: 94   Resp: 18   Temp: 98.1 °F (36.7 °C)      NAD  RRR  Non-labored breathing, BRIJESH  S, ND, NT    Renal US (3/17/22): Incidentally visualized small splenule. Aneurysm of L main renal artery measuring 1.8 cm x 1.7 cm x 1.8 cm with diffuse echogenic calcification. Borderline/mild proximal R and distal L renal artery stenosis. Celiac trunk and SMA velocity elevation suggestive of stenosis.    62 yo M w/ asymptomatic L renal artery aneurysm.   - Imaging last month demonstrates stability of aneurysm (1.8 cm from 1.7 cm in 2021).  - No indication for surgical intervention in this patient.   - Continue routine surveillance. Repeat US and RTC in 1 year.  - Educated patient on smoking as strongest risk factor for aneurysm expansion and rupture and encouraged ongoing smoking cessation.

## 2022-05-05 NOTE — HISTORICAL [facility]ERNER
This is a historical note converted from [employer]. Formatting and pictures may have been removed.  Please reference [employer] for original formatting and attached multimedia. Procedure Name  Colonoscopy  Indication  58 yo male presenting for screening colonoscopy after having a +FOBT  Pre-Procedure Exam  General: NAD  Neuro: AOx3, speech intact  HEENT: NCAT  Heart: regular rate, extremities well perfused  Lungs: unlabored breathing on room air  Abd: s/nt/nd, no surgical scars  Ext: no edema, 2+ radial pulse b/l  Skin: no?rashes present  Procedural Sedation  moderate  Technique  Written consents were obtained. The patient was taken back to the procedure room by anesthesia. Propofol was administered for procedural sedation. A digital rectal exam was performed, there was normal resting tone and no blood. The colonoscope was introduced into the anus and advanced under direct vision to the?cecum. A large polyp (3 cm?diameter)?was found at the proximal sigmoid colon,?approx..?35 cm from the anal verge,?this was hot snared and sent for permanent?pathology.?Two smaller polyps were?also hot snared at 20-25 cm from the anal verge?and sent for permanent pathology. A retroflexed view of the anal canal was performed?without?noting additional pathology. The air was evacuated from the rectum and the scope was withdrawn. The patient tolerated the procedure well.  Post-Procedure Exam  General: NAD  Neuro: AOx3, speech intact  HEENT: NCAT  Heart: regular rate, extremities well perfused  Lungs: unlabored breathing on room air  Abd: s/nt/nd, no surgical scars  Ext: no edema, 2+ radial pulse b/l  Skin: no?rashes present  Complications  none  Assessment/Plan  colon polyp ?  - Repeat colonoscopy in 3 months  - Will contact patient regarding pathology if indicated

## 2022-05-05 NOTE — HISTORICAL [facility] C[facility]ER
This is a historical note converted from [employer]. Formatting and pictures may have been removed.  Please reference [facility] for original formatting and attached multimedia. Reason for Consultation  screening colonoscopy  History of Present Illness  59 year old male with a history of HTN?presenting for colonoscopy. Has been told he has microscopic blood in stool and he says he sometimes he has blood when wiping. No family history of CRC.  Tolerated bowel prep.  Review of Systems  Review of systems negative?for?CP, SOB, f/c, n/v  Physical Exam  Vitals & Measurements  T:?36.5? ?C (Oral)? HR:?74(Monitored)? RR:?18? BP:?110/73? SpO2:?99%? WT:?88.6?kg?  General: NAD  Neuro: AOx3, speech intact  HEENT: NCAT  Heart: regular rate, extremities well perfused  Lungs: unlabored breathing on room air  Abd: s/nt/nd, no surgical scars  Ext: no edema, 2+ radial pulse b/l  Skin: no?rashes present  ?  Assessment/Plan  60 yo male presenting for colonoscopy  - proceed to GI lab, consents obtained. dd  ?   Problem List/Past Medical History  Ongoing  Elevated liver function tests  Hypertension  Microhematuria  Obesity  Positive FIT (fecal immunochemical test)  Prediabetes  Tobacco user  Wellness examination  Historical  No qualifying data  Procedure/Surgical History  Repair Right Lower Arm Skin, External Approach (08/23/2018)  Simple repair of superficial wounds of scalp, neck, axillae, external genitalia, trunk and/or extremities (including hands and feet); 2.6 cm to 7.5 cm (08/23/2018)  Tendon repair right foot   Medications  Inpatient  buffered lidocaine 2% - 0.5 ml syringe, 10 mg= 0.5 mL, Subcutaneous, As Directed  hydrALAZINE (Apresoline) Inj., 20 mg= 1 mL, IV, Once  IVF Lactated Ringers LR Infusion 1,000 mL, 1000 mL, IV  Home  hydrochlorothiazide-lisinopril 25 mg-20 mg oral tablet, 1 tab(s), Oral, Daily, 3 refills  Allergies  No Known Medication Allergies  Social History  Abuse/Neglect  No, No, Yes, 11/01/2019  Alcohol  Current,  08/27/2018  Employment/School  Employed, 09/17/2018  Home/Environment  Lives with Alone. Living situation: Home/Independent. Alcohol abuse in household: No. Substance abuse in household: No. Smoker in household: Yes. Injuries/Abuse/Neglect in household: No. Feels unsafe at home: No. Safe place to go: Yes. Agency(s)/Others notified: No. Family/Friends available for support: Yes. Concern for family members at home: No. Major illness in household: No. Financial concerns: No. TV/Computer concerns: No., 09/17/2018  Substance Use  Never, 08/27/2018  Tobacco  10 or more cigarettes (1/2 pack or more)/day in last 30 days, Cigarettes, No, 11/01/2019  Family History  Dementia: Father.  Primary malignant neoplasm of lung: Mother.  Stroke: Father.  Immunizations  Vaccine Date Status   influenza virus vaccine, inactivated 01/31/2018 Recorded

## 2022-05-23 ENCOUNTER — OFFICE VISIT (OUTPATIENT)
Dept: GASTROENTEROLOGY | Facility: CLINIC | Age: 62
End: 2022-05-23
Payer: MEDICAID

## 2022-05-23 VITALS
TEMPERATURE: 98 F | SYSTOLIC BLOOD PRESSURE: 135 MMHG | BODY MASS INDEX: 33.15 KG/M2 | RESPIRATION RATE: 20 BRPM | WEIGHT: 211.19 LBS | HEIGHT: 67 IN | DIASTOLIC BLOOD PRESSURE: 79 MMHG | OXYGEN SATURATION: 98 % | HEART RATE: 75 BPM

## 2022-05-23 DIAGNOSIS — K74.60 LIVER CIRRHOSIS SECONDARY TO NASH (NONALCOHOLIC STEATOHEPATITIS): Primary | ICD-10-CM

## 2022-05-23 DIAGNOSIS — K75.81 LIVER CIRRHOSIS SECONDARY TO NASH (NONALCOHOLIC STEATOHEPATITIS): Primary | ICD-10-CM

## 2022-05-23 PROCEDURE — 99214 OFFICE O/P EST MOD 30 MIN: CPT | Mod: PBBFAC | Performed by: STUDENT IN AN ORGANIZED HEALTH CARE EDUCATION/TRAINING PROGRAM

## 2022-05-23 NOTE — PROGRESS NOTES
Subjective:       Patient ID: Dawson Guerra is a 61 y.o. male.    Chief Complaint: Cirrhosis    61-year-old male with history of hypertension obesity, tobacco use and alcohol abuse presenting here to GI clinic for follow-up appointment for his cirrhosis.  Patient last seen by me November 22, 2021, at that time his cirrhotic liver disease has been well compensated, his MELD score  was 9, and his Child Neff class A. His last EGD performed June of 2021 and head revealed some small esophageal varices grade 1 in the mid and distal esophagus, nonbleeding.  His last abdominal ultrasound was February 2022 that revealed a cirrhotic appearing liver with no focal lesions present.  He has not had any hospitalizations or other ER visits for decompensation of his cirrhosis.  My recommendation at that time had been for him to have repeat EGD in 2023, and he has been avoiding alcohol since that time.      He initially was seen by our clinic after obtaining records from [facility] in January of 2020, his workup in the past has also included a liver biopsy in February of 2020 with findings of cirrhosis with mild steatosis.  The pathology did not elaborate on micro versus macrovesicular steatosis are common or need further findings other than cirrhosis.  The case has been discussed with staff here and was recommended to treat his alcohol plus/minus King cirrhosis.  Has undergone a colonoscopy in the past with Dr. Colunga November of 2019 with findings of 3 adenomatous polyps in the sigmoid colon and recommended recall of 3 years.  He does smoke 10 cigarettes daily and at that time was drinking alcohol on occasion.  He used to drink whiskey, rum and beer on the weekends for several years in the past.  Denied a family history of IBD, colon polyps or colon cancer.    Today's visit:  Denies any complaints today, denies any recent ER visits for decompensation of cirrhosis, denies any jaundice, abdominal swelling, lower extremity  swelling.  Only admits to easy bleeding with scratches or cuts of his upper extremities, I explained him likely result of thrombocytopenia from his cirrhotic liver disease.  He continues to avoid raw shellfish or NSAIDs.  Discussed his ultrasound result findings from February 2022, no focal lesions present.  He will be due for upcoming HCC screening with abdominal ultrasound August 2022.     Review of Systems   Constitutional: Negative.    HENT: Negative.    Eyes: Negative.    Respiratory: Negative.    Cardiovascular: Negative.    Gastrointestinal: Negative.    Endocrine: Negative.    Genitourinary: Negative.    Musculoskeletal: Negative.    Integumentary:  Negative.   Allergic/Immunologic: Negative.    Neurological: Negative.    Hematological: Bruises/bleeds easily.   Psychiatric/Behavioral: Negative.          Objective:  Vitals:    05/23/22 1305   BP: 135/79   Pulse: 75   Resp: 20   Temp: 98.3 °F (36.8 °C)         Physical Exam  Constitutional:       Appearance: Normal appearance. He is obese.   HENT:      Head: Normocephalic and atraumatic.      Nose: Nose normal.   Eyes:      Conjunctiva/sclera: Conjunctivae normal.      Pupils: Pupils are equal, round, and reactive to light.   Cardiovascular:      Rate and Rhythm: Normal rate and regular rhythm.      Pulses: Normal pulses.      Heart sounds: Normal heart sounds.   Pulmonary:      Effort: Pulmonary effort is normal.      Breath sounds: Normal breath sounds.   Abdominal:      General: Bowel sounds are normal.      Palpations: Abdomen is soft.   Musculoskeletal:         General: Normal range of motion.      Cervical back: Neck supple.   Skin:     General: Skin is warm and dry.   Neurological:      General: No focal deficit present.      Mental Status: He is alert and oriented to person, place, and time.   Psychiatric:         Mood and Affect: Mood normal.         Behavior: Behavior normal.         Thought Content: Thought content normal.         Judgment: Judgment  normal.             Assessment:       Problem List Items Addressed This Visit        GI    Liver cirrhosis secondary to ROCHA (nonalcoholic steatohepatitis) - Primary    Relevant Orders    US Abdomen Limited (LIVER)          Plan:         Background:  Alcohol: yes, in the past    Tobacco:  yes    Liver disease: ROCHA    MELD-Na: 9  Child-Neff Class: A    Transplant: not under evaluation, low MELD    Cirrhosis is decompensated with:    Ascites: no  Spontaneous bacterial peritonitis: No  Hepatic Encephalopathy: No  Hepatocellular carcinoma: No  Hepatorenal syndrome: No  Hyponatremia: No  Muscle wasting: No  Portal vein thrombosis: No    Screening:  Last EGD:  Hanna 15, 2021, revealed mild gastritis, small esophageal varices grade 1 in the mid and distal esophagus, nonbleeding.      Last imaging study:  Abd US:  February 2023, cirrhotic appearing liver with no focal lesions present.      CIRRHOSIS COUNSELING:  - strict abstinence of alcohol use  - low sodium (salt) 2000mg per day  - Nutrition: 25-30kcal (calorie per body weight in kilogram) per day  - No need to restrict protein in diet  - High protein diet: 1.2-1.5 gram/kg (protein per body weight in kg) per day to prevent muscle mass loss  - Resistance exercises for muscle strength  - Avoid raw seafoods due to risk of fatal Vibrio vulnificus infection  - Avoid Non-steroidal anti-inflammatory drugs (NSAIDs) such as ibuprofen, Motrin, naproxen, Aleve due to risk of kidney damage  - Can take acetaminophen (tylenol), no more than 2000mg per day  - Compliance with all medications  - Ultrasound or MRI of the liver every 6 months for liver cancer screening  - Upper endoscopy every 1-2 years to screen for varices        Plan for follow-up in 6 months, next abdominal ultrasound will be due August 2022  Continue abstinence from alcohol, raw seafood in NSAIDs.  Will need EGD in 2023.

## 2022-09-12 ENCOUNTER — HOSPITAL ENCOUNTER (OUTPATIENT)
Dept: RADIOLOGY | Facility: HOSPITAL | Age: 62
Discharge: HOME | End: 2022-09-12
Attending: STUDENT IN AN ORGANIZED HEALTH CARE EDUCATION/TRAINING PROGRAM
Payer: MEDICAID

## 2022-09-12 DIAGNOSIS — K74.60 LIVER CIRRHOSIS SECONDARY TO NASH (NONALCOHOLIC STEATOHEPATITIS): ICD-10-CM

## 2022-09-12 DIAGNOSIS — K75.81 LIVER CIRRHOSIS SECONDARY TO NASH (NONALCOHOLIC STEATOHEPATITIS): ICD-10-CM

## 2022-09-12 PROCEDURE — 76705 ECHO EXAM OF ABDOMEN: CPT | Mod: TC

## 2022-09-23 ENCOUNTER — LAB VISIT (OUTPATIENT)
Dept: LAB | Facility: HOSPITAL | Age: 62
End: 2022-09-23
Attending: NURSE PRACTITIONER
Payer: MEDICAID

## 2022-09-23 DIAGNOSIS — D69.6 THROMBOCYTOPENIA: ICD-10-CM

## 2022-09-23 DIAGNOSIS — Z11.3 ROUTINE SCREENING FOR STI (SEXUALLY TRANSMITTED INFECTION): ICD-10-CM

## 2022-09-23 DIAGNOSIS — R31.29 MICROHEMATURIA: ICD-10-CM

## 2022-09-23 DIAGNOSIS — E78.1 HYPERTRIGLYCERIDEMIA: ICD-10-CM

## 2022-09-23 DIAGNOSIS — Z00.00 WELLNESS EXAMINATION: Primary | ICD-10-CM

## 2022-09-23 DIAGNOSIS — I10 HYPERTENSION, UNSPECIFIED TYPE: ICD-10-CM

## 2022-09-23 LAB
ALBUMIN SERPL-MCNC: 4 GM/DL (ref 3.4–4.8)
ALBUMIN/GLOB SERPL: 0.9 RATIO (ref 1.1–2)
ALP SERPL-CCNC: 47 UNIT/L (ref 40–150)
ALT SERPL-CCNC: 32 UNIT/L (ref 0–55)
APPEARANCE UR: CLEAR
AST SERPL-CCNC: 21 UNIT/L (ref 5–34)
BACTERIA #/AREA URNS AUTO: ABNORMAL /HPF
BASOPHILS # BLD AUTO: 0.06 X10(3)/MCL (ref 0–0.2)
BASOPHILS NFR BLD AUTO: 0.8 %
BILIRUB UR QL STRIP.AUTO: NEGATIVE MG/DL
BILIRUBIN DIRECT+TOT PNL SERPL-MCNC: 0.6 MG/DL
BUN SERPL-MCNC: 18.1 MG/DL (ref 8.4–25.7)
CALCIUM SERPL-MCNC: 9.8 MG/DL (ref 8.8–10)
CHLORIDE SERPL-SCNC: 103 MMOL/L (ref 98–107)
CHOLEST SERPL-MCNC: 184 MG/DL
CHOLEST/HDLC SERPL: 7 {RATIO} (ref 0–5)
CO2 SERPL-SCNC: 25 MMOL/L (ref 23–31)
COLOR UR AUTO: YELLOW
CREAT SERPL-MCNC: 0.76 MG/DL (ref 0.73–1.18)
EOSINOPHIL # BLD AUTO: 0.25 X10(3)/MCL (ref 0–0.9)
EOSINOPHIL NFR BLD AUTO: 3.1 %
ERYTHROCYTE [DISTWIDTH] IN BLOOD BY AUTOMATED COUNT: 13.5 % (ref 11.5–17)
EST. AVERAGE GLUCOSE BLD GHB EST-MCNC: 82.5 MG/DL
GFR SERPLBLD CREATININE-BSD FMLA CKD-EPI: >60 MLS/MIN/1.73/M2
GLOBULIN SER-MCNC: 4.3 GM/DL (ref 2.4–3.5)
GLUCOSE SERPL-MCNC: 99 MG/DL (ref 82–115)
GLUCOSE UR QL STRIP.AUTO: NORMAL MG/DL
HAV IGM SERPL QL IA: NONREACTIVE
HBA1C MFR BLD: 4.5 %
HBV CORE IGM SERPL QL IA: NONREACTIVE
HBV SURFACE AG SERPL QL IA: NONREACTIVE
HCT VFR BLD AUTO: 47.7 % (ref 42–52)
HCV AB SERPL QL IA: NONREACTIVE
HDLC SERPL-MCNC: 26 MG/DL (ref 35–60)
HGB BLD-MCNC: 16 GM/DL (ref 14–18)
HIV 1+2 AB+HIV1 P24 AG SERPL QL IA: NONREACTIVE
HYALINE CASTS #/AREA URNS LPF: ABNORMAL /LPF
IMM GRANULOCYTES # BLD AUTO: 0.13 X10(3)/MCL (ref 0–0.04)
IMM GRANULOCYTES NFR BLD AUTO: 1.6 %
KETONES UR QL STRIP.AUTO: NEGATIVE MG/DL
LDLC SERPL CALC-MCNC: 109 MG/DL (ref 50–140)
LEUKOCYTE ESTERASE UR QL STRIP.AUTO: 75 UNIT/L
LYMPHOCYTES # BLD AUTO: 2.2 X10(3)/MCL (ref 0.6–4.6)
LYMPHOCYTES NFR BLD AUTO: 27.5 %
MCH RBC QN AUTO: 30.9 PG (ref 27–31)
MCHC RBC AUTO-ENTMCNC: 33.5 MG/DL (ref 33–36)
MCV RBC AUTO: 92.1 FL (ref 80–94)
MONOCYTES # BLD AUTO: 0.64 X10(3)/MCL (ref 0.1–1.3)
MONOCYTES NFR BLD AUTO: 8 %
MUCOUS THREADS URNS QL MICRO: ABNORMAL /LPF
NEUTROPHILS # BLD AUTO: 4.7 X10(3)/MCL (ref 2.1–9.2)
NEUTROPHILS NFR BLD AUTO: 59 %
NITRITE UR QL STRIP.AUTO: NEGATIVE
NRBC BLD AUTO-RTO: 0 %
PH UR STRIP.AUTO: 7 [PH]
PLATELET # BLD AUTO: 137 X10(3)/MCL (ref 130–400)
PLATELETS.RETICULATED NFR BLD AUTO: 2.8 % (ref 0.9–11.2)
PMV BLD AUTO: 9.4 FL (ref 7.4–10.4)
POTASSIUM SERPL-SCNC: 3.8 MMOL/L (ref 3.5–5.1)
PROT SERPL-MCNC: 8.3 GM/DL (ref 5.8–7.6)
PROT UR QL STRIP.AUTO: NEGATIVE MG/DL
PSA SERPL-MCNC: 0.77 NG/ML
RBC # BLD AUTO: 5.18 X10(6)/MCL (ref 4.7–6.1)
RBC #/AREA URNS AUTO: ABNORMAL /HPF
RBC UR QL AUTO: NEGATIVE UNIT/L
SODIUM SERPL-SCNC: 138 MMOL/L (ref 136–145)
SP GR UR STRIP.AUTO: 1.02
SQUAMOUS #/AREA URNS LPF: ABNORMAL /HPF
T PALLIDUM AB SER QL: NONREACTIVE
TRIGL SERPL-MCNC: 245 MG/DL (ref 34–140)
TSH SERPL-ACNC: 1.79 UIU/ML (ref 0.35–4.94)
UROBILINOGEN UR STRIP-ACNC: NORMAL MG/DL
VLDLC SERPL CALC-MCNC: 49 MG/DL
WBC # SPEC AUTO: 8 X10(3)/MCL (ref 4.5–11.5)
WBC #/AREA URNS AUTO: ABNORMAL /HPF

## 2022-09-23 PROCEDURE — 80061 LIPID PANEL: CPT

## 2022-09-23 PROCEDURE — 81001 URINALYSIS AUTO W/SCOPE: CPT

## 2022-09-23 PROCEDURE — 85025 COMPLETE CBC W/AUTO DIFF WBC: CPT

## 2022-09-23 PROCEDURE — 86780 TREPONEMA PALLIDUM: CPT

## 2022-09-23 PROCEDURE — 80074 ACUTE HEPATITIS PANEL: CPT

## 2022-09-23 PROCEDURE — 84153 ASSAY OF PSA TOTAL: CPT

## 2022-09-23 PROCEDURE — 84443 ASSAY THYROID STIM HORMONE: CPT

## 2022-09-23 PROCEDURE — 87389 HIV-1 AG W/HIV-1&-2 AB AG IA: CPT

## 2022-09-23 PROCEDURE — 80053 COMPREHEN METABOLIC PANEL: CPT

## 2022-09-23 PROCEDURE — 83036 HEMOGLOBIN GLYCOSYLATED A1C: CPT

## 2022-09-23 PROCEDURE — 36415 COLL VENOUS BLD VENIPUNCTURE: CPT

## 2022-09-24 LAB — PATH REV: NORMAL

## 2022-09-26 ENCOUNTER — OFFICE VISIT (OUTPATIENT)
Dept: INTERNAL MEDICINE | Facility: CLINIC | Age: 62
End: 2022-09-26
Payer: MEDICAID

## 2022-09-26 VITALS
DIASTOLIC BLOOD PRESSURE: 78 MMHG | BODY MASS INDEX: 31.45 KG/M2 | HEIGHT: 67 IN | RESPIRATION RATE: 20 BRPM | TEMPERATURE: 98 F | WEIGHT: 200.38 LBS | HEART RATE: 77 BPM | SYSTOLIC BLOOD PRESSURE: 119 MMHG

## 2022-09-26 DIAGNOSIS — I10 HYPERTENSION, UNSPECIFIED TYPE: Primary | ICD-10-CM

## 2022-09-26 DIAGNOSIS — Z72.0 TOBACCO USER: ICD-10-CM

## 2022-09-26 DIAGNOSIS — K63.5 POLYP OF COLON, UNSPECIFIED PART OF COLON, UNSPECIFIED TYPE: ICD-10-CM

## 2022-09-26 DIAGNOSIS — K75.81 LIVER CIRRHOSIS SECONDARY TO NASH (NONALCOHOLIC STEATOHEPATITIS): ICD-10-CM

## 2022-09-26 DIAGNOSIS — E66.9 OBESITY, UNSPECIFIED CLASSIFICATION, UNSPECIFIED OBESITY TYPE, UNSPECIFIED WHETHER SERIOUS COMORBIDITY PRESENT: ICD-10-CM

## 2022-09-26 DIAGNOSIS — R31.29 MICROSCOPIC HEMATURIA: ICD-10-CM

## 2022-09-26 DIAGNOSIS — Z00.00 WELLNESS EXAMINATION: ICD-10-CM

## 2022-09-26 DIAGNOSIS — K74.60 LIVER CIRRHOSIS SECONDARY TO NASH (NONALCOHOLIC STEATOHEPATITIS): ICD-10-CM

## 2022-09-26 PROCEDURE — 1160F RVW MEDS BY RX/DR IN RCRD: CPT | Mod: CPTII,,, | Performed by: NURSE PRACTITIONER

## 2022-09-26 PROCEDURE — 3074F SYST BP LT 130 MM HG: CPT | Mod: CPTII,,, | Performed by: NURSE PRACTITIONER

## 2022-09-26 PROCEDURE — 1159F MED LIST DOCD IN RCRD: CPT | Mod: CPTII,,, | Performed by: NURSE PRACTITIONER

## 2022-09-26 PROCEDURE — 99215 OFFICE O/P EST HI 40 MIN: CPT | Mod: PBBFAC | Performed by: NURSE PRACTITIONER

## 2022-09-26 PROCEDURE — 99214 PR OFFICE/OUTPT VISIT, EST, LEVL IV, 30-39 MIN: ICD-10-PCS | Mod: S$PBB,,, | Performed by: NURSE PRACTITIONER

## 2022-09-26 PROCEDURE — 3078F DIAST BP <80 MM HG: CPT | Mod: CPTII,,, | Performed by: NURSE PRACTITIONER

## 2022-09-26 PROCEDURE — 3078F PR MOST RECENT DIASTOLIC BLOOD PRESSURE < 80 MM HG: ICD-10-PCS | Mod: CPTII,,, | Performed by: NURSE PRACTITIONER

## 2022-09-26 PROCEDURE — 99214 OFFICE O/P EST MOD 30 MIN: CPT | Mod: S$PBB,,, | Performed by: NURSE PRACTITIONER

## 2022-09-26 PROCEDURE — 1160F PR REVIEW ALL MEDS BY PRESCRIBER/CLIN PHARMACIST DOCUMENTED: ICD-10-PCS | Mod: CPTII,,, | Performed by: NURSE PRACTITIONER

## 2022-09-26 PROCEDURE — 3074F PR MOST RECENT SYSTOLIC BLOOD PRESSURE < 130 MM HG: ICD-10-PCS | Mod: CPTII,,, | Performed by: NURSE PRACTITIONER

## 2022-09-26 PROCEDURE — 1159F PR MEDICATION LIST DOCUMENTED IN MEDICAL RECORD: ICD-10-PCS | Mod: CPTII,,, | Performed by: NURSE PRACTITIONER

## 2022-09-26 PROCEDURE — 3008F PR BODY MASS INDEX (BMI) DOCUMENTED: ICD-10-PCS | Mod: CPTII,,, | Performed by: NURSE PRACTITIONER

## 2022-09-26 PROCEDURE — 3008F BODY MASS INDEX DOCD: CPT | Mod: CPTII,,, | Performed by: NURSE PRACTITIONER

## 2022-09-26 RX ORDER — LISINOPRIL AND HYDROCHLOROTHIAZIDE 20; 25 MG/1; MG/1
1 TABLET ORAL DAILY
Qty: 90 TABLET | Refills: 1 | Status: SHIPPED | OUTPATIENT
Start: 2022-09-26 | End: 2023-03-27 | Stop reason: SDUPTHER

## 2022-09-26 RX ORDER — AMOXICILLIN 500 MG
1 CAPSULE ORAL DAILY
COMMUNITY

## 2022-09-26 RX ORDER — ASPIRIN 81 MG/1
81 TABLET ORAL EVERY OTHER DAY
COMMUNITY

## 2022-09-26 NOTE — PROGRESS NOTES
BAIRON Morton   [facility]  [address] [facility] [address]      PATIENT NAME: Dawson Guerra  : 1960  DATE: 22  MRN: [id number]      Billing Provider: BAIRON Morton  Level of Service:   Patient PCP Information       Provider PCP Type    BAIRON Morton General            Reason for Visit / Chief Complaint: Follow-up (Lab review) and Flu Vaccine       History of Present Illness / Problem Focused Workflow     Dawson Guerra presents to the clinic with Follow-up (Lab review) and Flu Vaccine         Initial Visit (18): 59 y/o  male here today to establish primary care. PMHx significant for HTN. Currently taking HCTZ-Lisinopril 25 mg/20 mg daily that was prescribed after pt presented to the ER on 18 d/t uncontrolled HTN. Bp was noted at 218/127. Today, Bp is at goal. Reports 100% compliance with medication. Daily tobacco user. Smoking ~ 1/2 PPD. Not ready to quit, however, he reports that this is significantly less than his 2 PPD hx. He reports occasional clear sputum production and nighttime wheezing. Denies fever, chills, night sweats, chest pain, SOB, HA, dizziness, weakness, abdominal pain, B/B incontinence, or any other concerns today.    18: 58 y.o. male presenting for f/u HTN, Tobacco Use. Bp at goal. HR slightly tachycardic. Reports smoking 1/2 cigarette in the parking lot prior to OV. Currently taking HCTZ-Lisinopril 25 mg-20 mg daily. Tolerating well. ~15 lb intentional weight loss. Pt states that after he was told his cholesterol level was elevated, he changed his diet. Continues to smoke same as above. Not ready to quit. CXR completed this am d/t former c/o wheezing. Denies wheeze or cough now. Denies CP or SOB. No other problems stated.    (19): Pt presenting for annual f/u. PmHx of HTN and Tobacco Use. He was contacted by GI in 2019 to schedule colonoscopy due to  "+FIT (10/2018), however, GI was unable to reach pt. FIT remains +. He states that he has been having "a lot going on, " and then admitted that he was nervous about having the scope. He does admit occasional bright red blood-tinged stools, but states "it doesn't happen all the time." He plans on visiting GI lab today to schedule.     Bp at goal. Taking HCTZ-Lisinopril 25 mg-20 mg daily.   Lab review:   HgA1c 6.1% (borderline high)   Mildly elevated AST. Pt admits to having an alcoholic beverage the night before. States that he drinks alcoholic beverages 2-3x/week   PSA 0.7   Slightly decreased total RBC count and platelet count   UA revealed MSH. MSH was present last year, but resolved on repeat assessment. Denies any LUTS.     Denies fever, chills, weakness, dizziness, night sweats, HA, chest pain, SOB, cough, wheeze, abd pain, dysuria, swelling, or any acute concerns.    Telephone Visit (2020): Mr. Osman is a 59 yo male with a PmHx of HTN, HLD (not taking any antihyperlipidemics per pt preference), cirrhosis of the liver (following GI), h/o ETOH abuse, colon polyps (3 adenomatous polyps in sigmoid colon , repeat 3 yrs), prediabetes, MSH, diverticulosis, left renal artery aneurysm (seen in Sx clinic 2019 w/ 1 yr f/u to monitor), and Tobacco Use, presenting for routine f/u via telephone due to COVID-19 pandemic. Patient has consented to telephone visit and was able to verify specific patient identification. I have verified that only myself and pt are on the telephone call and call is taking place in the [facility]. Pt last seen in clinic 2019. Has been following GI regularly for cirrhosis. Last visit 2020. He will f/u in 6 mths with labs. He was also referred for esophageal varices screening. He has been unable to complete labs as they  previously. Only taking HCTZ-Lisinopril 25 mg-20 mg daily. Reports tolerating well. Denies acute concerns.    Telephone Visit (10/7/2020): " "Mr. Osman is a 59 yo male with a PmHx of HTN, HLD (not taking any antihyperlipidemics per pt preference), cirrhosis of the liver (following GI), h/o ETOH abuse, colon polyps (3 adenomatous polyps in sigmoid colon 2019, repeat 3 yrs), prediabetes, MSH, diverticulosis, left renal artery aneurysm (seen in Sx clinic 11/12/2019 w/ 1 yr f/u to monitor), and Tobacco Use, presenting for routine f/u to review labs via telephone due to COVID-19 pandemic.  This is a telemedicine note. Patient was treated using telemedicine, real time audio, according to [facility] protocols. I conducted the visit from internal medicine office identified below. The patient participated in the visit at a non-[facility] location selected by the patient, identified below. I am licensed in the state where the patient stated they are located. The patient stated that they understood and accepted the privacy and security risks to their information at their location.   Patient was located at home  I was located at internal medicine office     Lab review:   PSA 0.5   HgA1c 5.2   CMP unremarkable   Trig 219   UA abnl, with MSH noted. Urine culture revealed no growth thus far.   Pt compliant w/ meds except for ASA because "somebody said it's not good to take aspirin every day!"   He will f/u in Vascular clinic next mth and is following GI.   No other problems stated.    (4/14/2021): Mr. Osman is a 59 yo male with a PmHx of HTN, HLD (not taking any antihyperlipidemics per pt preference), cirrhosis of the liver (following GI), h/o ETOH abuse, colon polyps (3 adenomatous polyps in sigmoid colon 2019, repeat 3 yrs), prediabetes, MSH, diverticulosis, left renal artery aneurysm, and Tobacco Use, presenting for f/u. Last visit with Vascular 1/2021 for left renal artery aneurysm. Aneurysm < 2 cm on imaging so no intervention at this time. He will f/u with them in 1 year. Labs performed and reviewed. ALT only minimally elevated above normal high. Pt will see GI in " "May 2021. EGD due 6/2021. He admits having a beer "every now and then." Trigs 200s. He was referred to Uro 10/2020 for MSH. No appt to date. CT A/P WWO 2019 did not reveal any findings to explain MSH. Denies easy bleeding, bruising, or gross hematuria. He reports compliance with Bp medication. Bp at goal today. No acute concerns.    Health Maintenance:   Colon Ca Screening-Colonoscopy revealed 3 adenomatous polyps in sigmoid colon 2019, repeat 3 yrs   Prostate Ca Screening-PSA 0.5    (10/14/2021): Mr. Osman is a 61 yo male with a PmHx of HTN, HLD (not taking any antihyperlipidemics per pt preference), cirrhosis of the liver (following GI), esophageal varices, h/o ETOH abuse, colon polyps (3 adenomatous polyps in sigmoid colon 2019, repeat 3 yrs), prediabetes, MSH, diverticulosis, left renal artery aneurysm, and Tobacco Use, presenting for f/u. S/p EGD with GI lab 6/16/21. Report and pathology results below. Pt will f/u with GI 11/22/2021. He is scheduled for surveillance of renal artery aneurysm in January. BP at goal. Pt is amenable to flu vaccine. He denies any acute concerns.   ED visit in July 2021 after "smashing" right index finger. He required stitches--which were removed. No complaints.   Lab review:  Chemistry panel unremarkable   HgA1c 4.9%   PSA 0.68   HDL 29, trig 311,    TSH WNL   Platelet count 127k   RBCs remain on UA    (4/1/2022): Mr. Osman is a 61 yo male with a PmHx of HTN, HLD (not taking any antihyperlipidemics per pt preference), cirrhosis of the liver (following GI), esophageal varices, h/o ETOH abuse, colon polyps (3 adenomatous polyps in sigmoid colon 2019, repeat 3 yrs), prediabetes, MSH, diverticulosis, left renal artery aneurysm, and Tobacco Use, presenting for f/u. Labs stable compared to personal baseline. Patient was scheduled in Vascular Clinic on 3/3/22 due to h/o renal artery aneurysm. He missed appt. He did complete renal artery US. He continues to follow GI " "clinic for cirrhosis. Scheduled to f/u 5/23/22. Reports has remained abstinent from ETOH use. At this time, he denies any acute s/s of hepatic decompensation. Pt compliant w/ meds except for ASA because "somebody said it's not good to take aspirin every day!" He denies any bleeding concerns. Previously referred to Uro for MSH. Last referral placed 4/2021. He states that he never received an appt. Denies gross hematuria. No acute concerns.    Today's Visit (9/26/2022): Mr. Osman is a 63 yo male with a PmHx of HTN, HLD (not taking any antihyperlipidemics per pt preference), cirrhosis of the liver (following GI), esophageal varices, h/o ETOH abuse, colon polyps (3 adenomatous polyps in sigmoid colon 2019, repeat 3 yrs), prediabetes, MSH, diverticulosis, left renal artery aneurysm, and Tobacco Use, presenting for f/u. Labs stable compared to personal baseline, aside from triglycerides 245. States had a piece of sausage prior to analysis. Patient was scheduled in Vascular Clinic on 5/3/22 due to h/o renal artery aneurysm. Noted stable on US. He will return in 2023 with annual US. He continues to follow GI clinic for cirrhosis. F/u 5/23/22. Reports has remained abstinent from ETOH use. At this time, he denies any acute s/s of hepatic decompensation and liver function tests are WNL. He defers vaccines. No other concerns today.        Review of Systems     Review of Systems   Constitutional: Negative.    HENT: Negative.     Eyes: Negative.    Respiratory: Negative.     Cardiovascular: Negative.    Gastrointestinal: Negative.  Negative for abdominal distention, abdominal pain, anal bleeding, blood in stool, constipation, diarrhea, nausea and vomiting.   Endocrine: Negative.    Genitourinary: Negative.    Musculoskeletal: Negative.    Skin: Negative.    Allergic/Immunologic: Negative.    Neurological: Negative.    Hematological: Negative.    Psychiatric/Behavioral: Negative.       Medical / Social / Family History "     Past Medical History:   Diagnosis Date    Elevated liver enzymes     Hypertension     Microhematuria     Positive FIT (fecal immunochemical test)     Prediabetes     Tobacco user     Unspecified cirrhosis of liver        Past Surgical History:   Procedure Laterality Date    COLONOSCOPY      ESOPHAGOGASTRODUODENOSCOPY      LIVER BIOPSY      POLYPECTOMY      TENDON REPAIR Right     foot       Social History    reports that he has been smoking cigarettes. He has a 5.00 pack-year smoking history. He uses smokeless tobacco. He reports that he does not currently use alcohol. He reports that he does not use drugs.    Family History  's family history includes Dementia in his father; Liver cancer in his brother; Lung cancer in his mother; Stroke in his father.    Medications and Allergies     Medications  Medication List with Changes/Refills   Current Medications    ASPIRIN (ECOTRIN) 81 MG EC TABLET    Take 81 mg by mouth every other day.    MULTIVITAMIN WITH IRON TAB    Take 1 tablet by mouth once daily.    OMEGA-3 FATTY ACIDS/FISH OIL (FISH OIL-OMEGA-3 FATTY ACIDS) 300-1,000 MG CAPSULE    Take 1 capsule by mouth once daily.   Changed and/or Refilled Medications    Modified Medication Previous Medication    LISINOPRIL-HYDROCHLOROTHIAZIDE (PRINZIDE,ZESTORETIC) 20-25 MG TAB lisinopriL-hydrochlorothiazide (PRINZIDE,ZESTORETIC) 20-25 mg Tab       Take 1 tablet by mouth once daily.    Take 1 tablet by mouth once daily.       Allergies  Review of patient's allergies indicates:  No Known Allergies    Physical Examination     Vitals:    09/26/22 0724   BP: 119/78   Pulse: 77   Resp: 20   Temp: 97.7 °F (36.5 °C)     Physical Exam  Constitutional:       Appearance: Normal appearance. He is obese.   HENT:      Head: Normocephalic and atraumatic.      Right Ear: Tympanic membrane, ear canal and external ear normal.      Left Ear: Tympanic membrane, ear canal and external ear normal.      Nose: Nose normal.      Mouth/Throat:       Mouth: Mucous membranes are moist.      Pharynx: Oropharynx is clear.   Eyes:      Extraocular Movements: Extraocular movements intact.      Conjunctiva/sclera: Conjunctivae normal.      Pupils: Pupils are equal, round, and reactive to light.   Neck:      Vascular: No carotid bruit.   Cardiovascular:      Rate and Rhythm: Normal rate and regular rhythm.      Pulses: Normal pulses.      Heart sounds: Normal heart sounds.   Pulmonary:      Effort: Pulmonary effort is normal.      Breath sounds: Normal breath sounds.   Abdominal:      General: Bowel sounds are normal.      Palpations: Abdomen is soft.   Musculoskeletal:         General: Normal range of motion.      Cervical back: Normal range of motion.      Right lower leg: No edema.      Left lower leg: No edema.   Skin:     General: Skin is warm and dry.   Neurological:      General: No focal deficit present.      Mental Status: He is alert and oriented to person, place, and time.   Psychiatric:         Mood and Affect: Mood normal.         Behavior: Behavior normal.         Thought Content: Thought content normal.         Judgment: Judgment normal.         Results     Lab Results   Component Value Date    WBC 8.0 09/23/2022    RBC 5.18 09/23/2022    HGB 16.0 09/23/2022    HCT 47.7 09/23/2022    MCV 92.1 09/23/2022    MCH 30.9 09/23/2022    MCHC 33.5 09/23/2022    RDW 13.5 09/23/2022     09/23/2022    MPV 9.4 09/23/2022     CMP  Sodium Level   Date Value Ref Range Status   09/23/2022 138 136 - 145 mmol/L Final     Potassium Level   Date Value Ref Range Status   09/23/2022 3.8 3.5 - 5.1 mmol/L Final     Carbon Dioxide   Date Value Ref Range Status   09/23/2022 25 23 - 31 mmol/L Final     Blood Urea Nitrogen   Date Value Ref Range Status   09/23/2022 18.1 8.4 - 25.7 mg/dL Final     Creatinine   Date Value Ref Range Status   09/23/2022 0.76 0.73 - 1.18 mg/dL Final     Calcium Level Total   Date Value Ref Range Status   09/23/2022 9.8 8.8 - 10.0 mg/dL Final      Albumin Level   Date Value Ref Range Status   09/23/2022 4.0 3.4 - 4.8 gm/dL Final     Bilirubin Total   Date Value Ref Range Status   09/23/2022 0.6 <=1.5 mg/dL Final     Alkaline Phosphatase   Date Value Ref Range Status   09/23/2022 47 40 - 150 unit/L Final     Aspartate Aminotransferase   Date Value Ref Range Status   09/23/2022 21 5 - 34 unit/L Final     Alanine Aminotransferase   Date Value Ref Range Status   09/23/2022 32 0 - 55 unit/L Final     Estimated GFR-Non    Date Value Ref Range Status   03/31/2022 >105 >=90      Lab Results   Component Value Date    CHOL 184 09/23/2022     Lab Results   Component Value Date    HDL 26 (L) 09/23/2022     No results found for: LDLCALC  Lab Results   Component Value Date    TRIG 245 (H) 09/23/2022     No results found for: CHOLHDL  Lab Results   Component Value Date    TSH 1.7876 09/23/2022     Lab Results   Component Value Date    PHUR 6.5 10/14/2021    PROTEINUA Negative 09/23/2022    GLUCUA Negative 10/14/2021    KETONESU Negative 10/14/2021    OCCULTUA 0.03 (A) 10/14/2021    NITRITE Negative 10/14/2021    LEUKOCYTESUR 75 09/23/2022           Assessment and Plan (including Health Maintenance)     Plan:         Health Maintenance Due   Topic Date Due    Pneumococcal Vaccines (Age 0-64) (1 - PCV) Never done    Colorectal Cancer Screening  Never done    Shingles Vaccine (1 of 2) Never done    COVID-19 Vaccine (4 - Booster for Pfizer series) 01/04/2022    Influenza Vaccine (1) 09/01/2022       Problem List Items Addressed This Visit          Cardiac/Vascular    Hypertension - Primary    Current Assessment & Plan     At goal  Continue Lisinopril-HCTZ 20-25 mg po daily  DASH         Relevant Medications    lisinopriL-hydrochlorothiazide (PRINZIDE,ZESTORETIC) 20-25 mg Tab    Other Relevant Orders    Comprehensive Metabolic Panel    CBC Auto Differential       Renal/    Microscopic hematuria    Current Assessment & Plan     Recent UA clear. He was  referred to Uro 4/2022. Awaiting an appt  However, denies gross hematuria or urinary concerns  Will monitor         Relevant Orders    Cytology, Urine    Urinalysis, Reflex to Urine Culture Urine, Clean Catch       Endocrine    Obesity    Current Assessment & Plan     Educated on aerobic exercise for 30 mins/day, at least 5 days per week. Low-fat diet              GI    Liver cirrhosis secondary to ORCHA (nonalcoholic steatohepatitis)    Current Assessment & Plan     Following Cirrhosis Clinic. Last visit 5/2022 with 6-month f/u  Abd US 9/12/22 stable  Repeat EGD 2023            Other    Wellness examination    Overview     Health Maintenance:  Colon Ca Screening-Colonoscopy revealed 3 adenomatous polyps in sigmoid colon 2019, repeat 3 yrs  Prostate Ca Screening-PSA 0.77, 9/2022           Current Assessment & Plan     Refer to GI for f/u colonoscopy         Tobacco user    Current Assessment & Plan     Cessation          Other Visit Diagnoses       Polyp of colon, unspecified part of colon, unspecified type        Relevant Orders    Ambulatory referral/consult to Gastroenterology            Health Maintenance Topics with due status: Not Due       Topic Last Completion Date    TETANUS VACCINE 07/12/2021    Lipid Panel 09/23/2022       Future Appointments   Date Time Provider Department Center   12/5/2022  1:00 PM Jeromy Olivia MD [facility] [place] [facility]   3/27/2023  7:15 AM BAIRON Morton [facility] [place] [facility]   5/9/2023 12:30 PM PROVIDER, [facility]            Signature:  BAIRON Morton  [facility]  [address] [facility] [id number]    Date of encounter: 9/26/22

## 2022-09-26 NOTE — ASSESSMENT & PLAN NOTE
Recent UA clear. He was referred to Uro 4/2022. Awaiting an appt  However, denies gross hematuria or urinary concerns  Will monitor

## 2022-09-26 NOTE — ASSESSMENT & PLAN NOTE
Following Cirrhosis Clinic. Last visit 5/2022 with 6-month f/u  Abd US 9/12/22 stable  Repeat EGD 2023

## 2022-12-06 DIAGNOSIS — Z12.11 SCREEN FOR COLON CANCER: Primary | ICD-10-CM

## 2022-12-14 RX ORDER — POLYETHYLENE GLYCOL 3350, SODIUM SULFATE, SODIUM CHLORIDE, POTASSIUM CHLORIDE, SODIUM ASCORBATE, AND ASCORBIC ACID 7.5-2.691G
2000 KIT ORAL ONCE
Qty: 1 KIT | Refills: 0 | Status: SHIPPED | OUTPATIENT
Start: 2022-12-14 | End: 2022-12-14

## 2022-12-19 ENCOUNTER — OFFICE VISIT (OUTPATIENT)
Dept: GASTROENTEROLOGY | Facility: CLINIC | Age: 62
End: 2022-12-19
Payer: MEDICAID

## 2022-12-19 VITALS
DIASTOLIC BLOOD PRESSURE: 63 MMHG | RESPIRATION RATE: 16 BRPM | WEIGHT: 196 LBS | BODY MASS INDEX: 30.76 KG/M2 | HEIGHT: 67 IN | HEART RATE: 91 BPM | SYSTOLIC BLOOD PRESSURE: 98 MMHG | TEMPERATURE: 99 F

## 2022-12-19 DIAGNOSIS — K70.30 ALCOHOLIC CIRRHOSIS OF LIVER WITHOUT ASCITES: Primary | ICD-10-CM

## 2022-12-19 PROCEDURE — 99214 OFFICE O/P EST MOD 30 MIN: CPT | Mod: PBBFAC | Performed by: STUDENT IN AN ORGANIZED HEALTH CARE EDUCATION/TRAINING PROGRAM

## 2022-12-19 NOTE — PROGRESS NOTES
Subjective:       Patient ID: Dawson Guerra is a 62 y.o. male.    Chief Complaint: Cirrhosis (No complaints)    62-year-old male with history of hypertension obesity, tobacco use and alcohol abuse presenting here to GI clinic for follow-up appointment for his cirrhosis.  Patient last seen by me November 22, 2021, at that time his cirrhotic liver disease has been well compensated, his MELD score  was 9, and his Child Neff class A. His last EGD performed June of 2021 and head revealed some small esophageal varices grade 1 in the mid and distal esophagus, nonbleeding.  His last abdominal ultrasound was February 2022 that revealed a cirrhotic appearing liver with no focal lesions present.  He has not had any hospitalizations or other ER visits for decompensation of his cirrhosis.  My recommendation at that time had been for him to have repeat EGD in 2023, and he has been avoiding alcohol since that time.       He initially was seen by our clinic after obtaining records from [facility] in January of 2020, his workup in the past has also included a liver biopsy in February of 2020 with findings of cirrhosis with mild steatosis.  The pathology did not elaborate on micro versus macrovesicular steatosis are common or need further findings other than cirrhosis.  The case has been discussed with staff here and was recommended to treat his alcohol plus/minus King cirrhosis.  Has undergone a colonoscopy in the past with Dr. Colunga November of 2019 with findings of 3 adenomatous polyps in the sigmoid colon and recommended recall of 3 years.  He does smoke 10 cigarettes daily and at that time was drinking alcohol on occasion.  He used to drink whiskey, rum and beer on the weekends for several years in the past.  Denied a family history of IBD, colon polyps or colon cancer.     5/23/22:  Denies any complaints today, denies any recent ER visits for decompensation of cirrhosis, denies any jaundice, abdominal swelling, lower  extremity swelling.  Only admits to easy bleeding with scratches or cuts of his upper extremities, I explained him likely result of thrombocytopenia from his cirrhotic liver disease.  He continues to avoid raw shellfish or NSAIDs.  Discussed his ultrasound result findings from February 2022, no focal lesions present.  He will be due for upcoming HCC screening with abdominal ultrasound August 2022.     Today's visit:  No complaints today, updated on HCC screening September this year, no masses or liver lesions present.  Will need to get updated MELD labs next month, and he is slated for EGD and colonoscopy May 2023.  Denies any melena, hematochezia, jaundice, lower extremity swelling no recent emergency room visits or hospitalizations for decompensated cirrhotic liver disease.      Review of Systems   Constitutional: Negative.    HENT: Negative.     Eyes: Negative.    Respiratory: Negative.     Cardiovascular: Negative.    Gastrointestinal: Negative.    Endocrine: Negative.    Genitourinary: Negative.    Musculoskeletal: Negative.    Integumentary:  Negative.   Allergic/Immunologic: Negative.    Neurological: Negative.    Hematological: Negative.    Psychiatric/Behavioral: Negative.         Objective:   Vitals:    12/19/22 1542   BP: 98/63   Pulse: 91   Resp: 16   Temp: 98.5 °F (36.9 °C)           Physical Exam  Constitutional:       Appearance: Normal appearance. He is obese.   HENT:      Head: Normocephalic and atraumatic.      Mouth/Throat:      Mouth: Mucous membranes are moist.      Pharynx: Oropharynx is clear.   Eyes:      Conjunctiva/sclera: Conjunctivae normal.      Pupils: Pupils are equal, round, and reactive to light.   Cardiovascular:      Rate and Rhythm: Normal rate and regular rhythm.      Pulses: Normal pulses.      Heart sounds: Normal heart sounds.   Pulmonary:      Effort: Pulmonary effort is normal.      Breath sounds: Normal breath sounds.   Abdominal:      General: Abdomen is flat. Bowel sounds  are normal.      Palpations: Abdomen is soft.   Musculoskeletal:         General: Normal range of motion.   Skin:     General: Skin is warm and dry.   Neurological:      General: No focal deficit present.      Mental Status: He is alert and oriented to person, place, and time. Mental status is at baseline.   Psychiatric:         Mood and Affect: Mood normal.         Thought Content: Thought content normal.         Judgment: Judgment normal.       Assessment:       Problem List Items Addressed This Visit    None  Visit Diagnoses       Alcoholic cirrhosis of liver without ascites    -  Primary    Relevant Orders    US Abdomen Limited_Liver    Protime-INR    Comprehensive Metabolic Panel              Plan:       Background:  Alcohol: yes, in the past     Tobacco:  yes     Liver disease: ROCHA     MELD-Na: 9, need updated MELD   Child-Neff Class: A     Transplant: not under evaluation, low MELD     Cirrhosis is decompensated with:     Ascites: no  Spontaneous bacterial peritonitis: No  Hepatic Encephalopathy: No  Hepatocellular carcinoma: No  Hepatorenal syndrome: No  Hyponatremia: No  Muscle wasting: No  Portal vein thrombosis: No     Screening:  Last EGD:  Hanna 15, 2021, revealed mild gastritis, small esophageal varices grade 1 in the mid and distal esophagus, nonbleeding.        Last imaging study:  Abd US:  September 2022: Hepatic steatosis and nodular contour suggesting cirrhosis.  No masses or liver lesions seen.     CIRRHOSIS COUNSELING:  - strict abstinence of alcohol use  - low sodium (salt) 2000mg per day  - Nutrition: 25-30kcal (calorie per body weight in kilogram) per day  - No need to restrict protein in diet  - High protein diet: 1.2-1.5 gram/kg (protein per body weight in kg) per day to prevent muscle mass loss  - Resistance exercises for muscle strength  - Avoid raw seafoods due to risk of fatal Vibrio vulnificus infection  - Avoid Non-steroidal anti-inflammatory drugs (NSAIDs) such as ibuprofen, Motrin,  naproxen, Aleve due to risk of kidney damage  - Can take acetaminophen (tylenol), no more than 2000mg per day  - Compliance with all medications  - Ultrasound or MRI of the liver every 6 months for liver cancer screening  - Upper endoscopy every 1-2 years to screen for varices      Six-month follow-up, at that point EGD and colonoscopy will be completed by May of 2023.    Updated MELD labs for next month January 2023.    HCC screening with abdominal ultrasound to be updated March 2023.

## 2022-12-26 PROBLEM — Z00.00 WELLNESS EXAMINATION: Status: RESOLVED | Noted: 2022-09-26 | Resolved: 2022-12-26

## 2023-02-27 ENCOUNTER — TELEPHONE (OUTPATIENT)
Dept: GASTROENTEROLOGY | Facility: CLINIC | Age: 63
End: 2023-02-27
Payer: MEDICAID

## 2023-02-27 NOTE — TELEPHONE ENCOUNTER
"Spoke with pt. Advised that he is overdue for lab work. Verbalized understanding. States, " I will do it March 8 when I come for my ultrasound."  "

## 2023-03-07 ENCOUNTER — LAB VISIT (OUTPATIENT)
Dept: LAB | Facility: HOSPITAL | Age: 63
End: 2023-03-07
Attending: NURSE PRACTITIONER
Payer: MEDICAID

## 2023-03-07 DIAGNOSIS — K70.30 ALCOHOLIC CIRRHOSIS OF LIVER WITHOUT ASCITES: ICD-10-CM

## 2023-03-07 DIAGNOSIS — I10 HYPERTENSION, UNSPECIFIED TYPE: ICD-10-CM

## 2023-03-07 LAB
ALBUMIN SERPL-MCNC: 3.9 G/DL (ref 3.4–4.8)
ALBUMIN/GLOB SERPL: 0.9 RATIO (ref 1.1–2)
ALP SERPL-CCNC: 48 UNIT/L (ref 40–150)
ALT SERPL-CCNC: 22 UNIT/L (ref 0–55)
AST SERPL-CCNC: 23 UNIT/L (ref 5–34)
BASOPHILS # BLD AUTO: 0.05 X10(3)/MCL (ref 0–0.2)
BASOPHILS NFR BLD AUTO: 0.7 %
BILIRUBIN DIRECT+TOT PNL SERPL-MCNC: 0.6 MG/DL
BUN SERPL-MCNC: 13.9 MG/DL (ref 8.4–25.7)
CALCIUM SERPL-MCNC: 10.7 MG/DL (ref 8.8–10)
CHLORIDE SERPL-SCNC: 104 MMOL/L (ref 98–107)
CO2 SERPL-SCNC: 25 MMOL/L (ref 23–31)
CREAT SERPL-MCNC: 0.78 MG/DL (ref 0.73–1.18)
EOSINOPHIL # BLD AUTO: 0.22 X10(3)/MCL (ref 0–0.9)
EOSINOPHIL NFR BLD AUTO: 3 %
ERYTHROCYTE [DISTWIDTH] IN BLOOD BY AUTOMATED COUNT: 13.8 % (ref 11.5–17)
GFR SERPLBLD CREATININE-BSD FMLA CKD-EPI: >60 MLS/MIN/1.73/M2
GLOBULIN SER-MCNC: 4.3 GM/DL (ref 2.4–3.5)
GLUCOSE SERPL-MCNC: 96 MG/DL (ref 82–115)
HCT VFR BLD AUTO: 43.8 % (ref 42–52)
HGB BLD-MCNC: 15.2 G/DL (ref 14–18)
IMM GRANULOCYTES # BLD AUTO: 0.09 X10(3)/MCL (ref 0–0.04)
IMM GRANULOCYTES NFR BLD AUTO: 1.2 %
LYMPHOCYTES # BLD AUTO: 2 X10(3)/MCL (ref 0.6–4.6)
LYMPHOCYTES NFR BLD AUTO: 27.1 %
MCH RBC QN AUTO: 31 PG
MCHC RBC AUTO-ENTMCNC: 34.7 G/DL (ref 33–36)
MCV RBC AUTO: 89.2 FL (ref 80–94)
MONOCYTES # BLD AUTO: 0.72 X10(3)/MCL (ref 0.1–1.3)
MONOCYTES NFR BLD AUTO: 9.8 %
NEUTROPHILS # BLD AUTO: 4.3 X10(3)/MCL (ref 2.1–9.2)
NEUTROPHILS NFR BLD AUTO: 58.2 %
NRBC BLD AUTO-RTO: 0 %
PLATELET # BLD AUTO: 129 X10(3)/MCL (ref 130–400)
PLATELETS.RETICULATED NFR BLD AUTO: 3.5 % (ref 0.9–11.2)
PMV BLD AUTO: 9.9 FL (ref 7.4–10.4)
POTASSIUM SERPL-SCNC: 3.9 MMOL/L (ref 3.5–5.1)
PROT SERPL-MCNC: 8.2 GM/DL (ref 5.8–7.6)
RBC # BLD AUTO: 4.91 X10(6)/MCL (ref 4.7–6.1)
SODIUM SERPL-SCNC: 137 MMOL/L (ref 136–145)
WBC # SPEC AUTO: 7.4 X10(3)/MCL (ref 4.5–11.5)

## 2023-03-07 PROCEDURE — 36415 COLL VENOUS BLD VENIPUNCTURE: CPT

## 2023-03-07 PROCEDURE — 85610 PROTHROMBIN TIME: CPT

## 2023-03-07 PROCEDURE — 85025 COMPLETE CBC W/AUTO DIFF WBC: CPT

## 2023-03-07 PROCEDURE — 80053 COMPREHEN METABOLIC PANEL: CPT

## 2023-03-08 ENCOUNTER — HOSPITAL ENCOUNTER (OUTPATIENT)
Dept: RADIOLOGY | Facility: HOSPITAL | Age: 63
Discharge: HOME | End: 2023-03-08
Attending: STUDENT IN AN ORGANIZED HEALTH CARE EDUCATION/TRAINING PROGRAM
Payer: MEDICAID

## 2023-03-08 DIAGNOSIS — K70.30 ALCOHOLIC CIRRHOSIS OF LIVER WITHOUT ASCITES: ICD-10-CM

## 2023-03-08 PROCEDURE — 76705 ECHO EXAM OF ABDOMEN: CPT | Mod: TC

## 2023-03-20 DIAGNOSIS — I72.2 RENAL ARTERY ANEURYSM: Primary | ICD-10-CM

## 2023-03-27 ENCOUNTER — OFFICE VISIT (OUTPATIENT)
Dept: INTERNAL MEDICINE | Facility: CLINIC | Age: 63
End: 2023-03-27
Payer: MEDICAID

## 2023-03-27 VITALS
SYSTOLIC BLOOD PRESSURE: 121 MMHG | WEIGHT: 193.19 LBS | BODY MASS INDEX: 30.32 KG/M2 | HEART RATE: 73 BPM | TEMPERATURE: 98 F | HEIGHT: 67 IN | RESPIRATION RATE: 20 BRPM | DIASTOLIC BLOOD PRESSURE: 74 MMHG

## 2023-03-27 DIAGNOSIS — R31.29 MICROSCOPIC HEMATURIA: Primary | ICD-10-CM

## 2023-03-27 DIAGNOSIS — K74.60 LIVER CIRRHOSIS SECONDARY TO NASH (NONALCOHOLIC STEATOHEPATITIS): ICD-10-CM

## 2023-03-27 DIAGNOSIS — Z00.00 WELLNESS EXAMINATION: ICD-10-CM

## 2023-03-27 DIAGNOSIS — Z12.5 SCREENING FOR PROSTATE CANCER: ICD-10-CM

## 2023-03-27 DIAGNOSIS — K75.81 LIVER CIRRHOSIS SECONDARY TO NASH (NONALCOHOLIC STEATOHEPATITIS): ICD-10-CM

## 2023-03-27 DIAGNOSIS — Z13.1 SCREENING FOR DIABETES MELLITUS: ICD-10-CM

## 2023-03-27 DIAGNOSIS — E83.52 HYPERCALCEMIA: ICD-10-CM

## 2023-03-27 DIAGNOSIS — I10 PRIMARY HYPERTENSION: ICD-10-CM

## 2023-03-27 DIAGNOSIS — I10 HYPERTENSION, UNSPECIFIED TYPE: ICD-10-CM

## 2023-03-27 DIAGNOSIS — Z72.0 TOBACCO USER: ICD-10-CM

## 2023-03-27 PROCEDURE — 1159F MED LIST DOCD IN RCRD: CPT | Mod: CPTII,,, | Performed by: NURSE PRACTITIONER

## 2023-03-27 PROCEDURE — 99214 OFFICE O/P EST MOD 30 MIN: CPT | Mod: S$PBB,,, | Performed by: NURSE PRACTITIONER

## 2023-03-27 PROCEDURE — 3074F PR MOST RECENT SYSTOLIC BLOOD PRESSURE < 130 MM HG: ICD-10-PCS | Mod: CPTII,,, | Performed by: NURSE PRACTITIONER

## 2023-03-27 PROCEDURE — 1160F RVW MEDS BY RX/DR IN RCRD: CPT | Mod: CPTII,,, | Performed by: NURSE PRACTITIONER

## 2023-03-27 PROCEDURE — 3078F DIAST BP <80 MM HG: CPT | Mod: CPTII,,, | Performed by: NURSE PRACTITIONER

## 2023-03-27 PROCEDURE — 99215 OFFICE O/P EST HI 40 MIN: CPT | Mod: PBBFAC | Performed by: NURSE PRACTITIONER

## 2023-03-27 PROCEDURE — 1159F PR MEDICATION LIST DOCUMENTED IN MEDICAL RECORD: ICD-10-PCS | Mod: CPTII,,, | Performed by: NURSE PRACTITIONER

## 2023-03-27 PROCEDURE — 99214 PR OFFICE/OUTPT VISIT, EST, LEVL IV, 30-39 MIN: ICD-10-PCS | Mod: S$PBB,,, | Performed by: NURSE PRACTITIONER

## 2023-03-27 PROCEDURE — 3008F PR BODY MASS INDEX (BMI) DOCUMENTED: ICD-10-PCS | Mod: CPTII,,, | Performed by: NURSE PRACTITIONER

## 2023-03-27 PROCEDURE — 99406 BEHAV CHNG SMOKING 3-10 MIN: CPT | Mod: S$PBB,59,, | Performed by: NURSE PRACTITIONER

## 2023-03-27 PROCEDURE — 3008F BODY MASS INDEX DOCD: CPT | Mod: CPTII,,, | Performed by: NURSE PRACTITIONER

## 2023-03-27 PROCEDURE — 3074F SYST BP LT 130 MM HG: CPT | Mod: CPTII,,, | Performed by: NURSE PRACTITIONER

## 2023-03-27 PROCEDURE — 3078F PR MOST RECENT DIASTOLIC BLOOD PRESSURE < 80 MM HG: ICD-10-PCS | Mod: CPTII,,, | Performed by: NURSE PRACTITIONER

## 2023-03-27 PROCEDURE — 1160F PR REVIEW ALL MEDS BY PRESCRIBER/CLIN PHARMACIST DOCUMENTED: ICD-10-PCS | Mod: CPTII,,, | Performed by: NURSE PRACTITIONER

## 2023-03-27 PROCEDURE — 99406 PR TOBACCO USE CESSATION INTERMEDIATE 3-10 MINUTES: ICD-10-PCS | Mod: S$PBB,59,, | Performed by: NURSE PRACTITIONER

## 2023-03-27 RX ORDER — LISINOPRIL AND HYDROCHLOROTHIAZIDE 20; 25 MG/1; MG/1
1 TABLET ORAL DAILY
Qty: 90 TABLET | Refills: 1 | Status: SHIPPED | OUTPATIENT
Start: 2023-03-27 | End: 2023-10-13

## 2023-03-27 NOTE — ASSESSMENT & PLAN NOTE
Following Cirrhosis Clinic. Last visit 12/2022  Abd US 3/2023, stable  Repeat EGD May 2023  Continue abstinence from ETOH

## 2023-03-27 NOTE — PROGRESS NOTES
BAIRON Morton   [facility]  [address] [facility] [address]      PATIENT NAME: Dawson Guerra  : 1960  DATE: 3/27/23  MRN: [id number]      Billing Provider: BAIRON Morton  Level of Service: [place] OFFICE/OUTPT VISIT, EST, LEVL IV, 30-39 MIN  Patient PCP Information       Provider PCP Type    BAIRON Morton General            Reason for Visit / Chief Complaint: Follow-up and Medication Refill       History of Present Illness / Problem Focused Workflow     Dawson Guerra presents to the clinic with Follow-up and Medication Refill           Initial Visit (18): 57 y/o  male here today to establish primary care. PMHx significant for HTN. Currently taking HCTZ-Lisinopril 25 mg/20 mg daily that was prescribed after pt presented to the ER on 18 d/t uncontrolled HTN. Bp was noted at 218/127. Today, Bp is at goal. Reports 100% compliance with medication. Daily tobacco user. Smoking ~ 1/2 PPD. Not ready to quit, however, he reports that this is significantly less than his 2 PPD hx. He reports occasional clear sputum production and nighttime wheezing. Denies fever, chills, night sweats, chest pain, SOB, HA, dizziness, weakness, abdominal pain, B/B incontinence, or any other concerns today.    18: 58 y.o. male presenting for f/u HTN, Tobacco Use. Bp at goal. HR slightly tachycardic. Reports smoking 1/2 cigarette in the parking lot prior to OV. Currently taking HCTZ-Lisinopril 25 mg-20 mg daily. Tolerating well. ~15 lb intentional weight loss. Pt states that after he was told his cholesterol level was elevated, he changed his diet. Continues to smoke same as above. Not ready to quit. CXR completed this am d/t former c/o wheezing. Denies wheeze or cough now. Denies CP or SOB. No other problems stated.    (19): Pt presenting for annual f/u. PmHx of HTN and Tobacco Use. He was contacted by GI in August  "2019 to schedule colonoscopy due to +FIT (10/2018), however, GI was unable to reach pt. FIT remains +. He states that he has been having "a lot going on, " and then admitted that he was nervous about having the scope. He does admit occasional bright red blood-tinged stools, but states "it doesn't happen all the time." He plans on visiting GI lab today to schedule.     Bp at goal. Taking HCTZ-Lisinopril 25 mg-20 mg daily.   Lab review:   HgA1c 6.1% (borderline high)   Mildly elevated AST. Pt admits to having an alcoholic beverage the night before. States that he drinks alcoholic beverages 2-3x/week   PSA 0.7   Slightly decreased total RBC count and platelet count   UA revealed MSH. MSH was present last year, but resolved on repeat assessment. Denies any LUTS.     Denies fever, chills, weakness, dizziness, night sweats, HA, chest pain, SOB, cough, wheeze, abd pain, dysuria, swelling, or any acute concerns.    Telephone Visit (2020): Mr. Osman is a 61 yo male with a PmHx of HTN, HLD (not taking any antihyperlipidemics per pt preference), cirrhosis of the liver (following GI), h/o ETOH abuse, colon polyps (3 adenomatous polyps in sigmoid colon , repeat 3 yrs), prediabetes, MSH, diverticulosis, left renal artery aneurysm (seen in Sx clinic 2019 w/ 1 yr f/u to monitor), and Tobacco Use, presenting for routine f/u via telephone due to COVID-19 pandemic. Patient has consented to telephone visit and was able to verify specific patient identification. I have verified that only myself and pt are on the telephone call and call is taking place in the [facility]. Pt last seen in clinic 2019. Has been following GI regularly for cirrhosis. Last visit 2020. He will f/u in 6 mths with labs. He was also referred for esophageal varices screening. He has been unable to complete labs as they  previously. Only taking HCTZ-Lisinopril 25 mg-20 mg daily. Reports tolerating well. Denies acute " "concerns.    Telephone Visit (10/7/2020): Mr. Osman is a 59 yo male with a PmHx of HTN, HLD (not taking any antihyperlipidemics per pt preference), cirrhosis of the liver (following GI), h/o ETOH abuse, colon polyps (3 adenomatous polyps in sigmoid colon 2019, repeat 3 yrs), prediabetes, MSH, diverticulosis, left renal artery aneurysm (seen in Sx clinic 11/12/2019 w/ 1 yr f/u to monitor), and Tobacco Use, presenting for routine f/u to review labs via telephone due to COVID-19 pandemic.  This is a telemedicine note. Patient was treated using telemedicine, real time audio, according to [facility] protocols. I conducted the visit from internal medicine office identified below. The patient participated in the visit at a non-[facility] location selected by the patient, identified below. I am licensed in the state where the patient stated they are located. The patient stated that they understood and accepted the privacy and security risks to their information at their location.   Patient was located at home  I was located at internal medicine office     Lab review:   PSA 0.5   HgA1c 5.2   CMP unremarkable   Trig 219   UA abnl, with MSH noted. Urine culture revealed no growth thus far.   Pt compliant w/ meds except for ASA because "somebody said it's not good to take aspirin every day!"   He will f/u in Vascular clinic next mth and is following GI.   No other problems stated.    (4/14/2021): Mr. Osman is a 59 yo male with a PmHx of HTN, HLD (not taking any antihyperlipidemics per pt preference), cirrhosis of the liver (following GI), h/o ETOH abuse, colon polyps (3 adenomatous polyps in sigmoid colon 2019, repeat 3 yrs), prediabetes, MSH, diverticulosis, left renal artery aneurysm, and Tobacco Use, presenting for f/u. Last visit with Vascular 1/2021 for left renal artery aneurysm. Aneurysm < 2 cm on imaging so no intervention at this time. He will f/u with them in 1 year. Labs performed and reviewed. ALT only minimally " "elevated above normal high. Pt will see GI in May 2021. EGD due 6/2021. He admits having a beer "every now and then." Trigs 200s. He was referred to Uro 10/2020 for MSH. No appt to date. CT A/P WWO 2019 did not reveal any findings to explain MSH. Denies easy bleeding, bruising, or gross hematuria. He reports compliance with Bp medication. Bp at goal today. No acute concerns.    Health Maintenance:   Colon Ca Screening-Colonoscopy revealed 3 adenomatous polyps in sigmoid colon 2019, repeat 3 yrs   Prostate Ca Screening-PSA 0.5    (10/14/2021): Mr. Osman is a 59 yo male with a PmHx of HTN, HLD (not taking any antihyperlipidemics per pt preference), cirrhosis of the liver (following GI), esophageal varices, h/o ETOH abuse, colon polyps (3 adenomatous polyps in sigmoid colon 2019, repeat 3 yrs), prediabetes, MSH, diverticulosis, left renal artery aneurysm, and Tobacco Use, presenting for f/u. S/p EGD with GI lab 6/16/21. Report and pathology results below. Pt will f/u with GI 11/22/2021. He is scheduled for surveillance of renal artery aneurysm in January. BP at goal. Pt is amenable to flu vaccine. He denies any acute concerns.   ED visit in July 2021 after "smashing" right index finger. He required stitches--which were removed. No complaints.   Lab review:  Chemistry panel unremarkable   HgA1c 4.9%   PSA 0.68   HDL 29, trig 311,    TSH WNL   Platelet count 127k   RBCs remain on UA    (4/1/2022): Mr. Osman is a 59 yo male with a PmHx of HTN, HLD (not taking any antihyperlipidemics per pt preference), cirrhosis of the liver (following GI), esophageal varices, h/o ETOH abuse, colon polyps (3 adenomatous polyps in sigmoid colon 2019, repeat 3 yrs), prediabetes, MSH, diverticulosis, left renal artery aneurysm, and Tobacco Use, presenting for f/u. Labs stable compared to personal baseline. Patient was scheduled in Vascular Clinic on 3/3/22 due to h/o renal artery aneurysm. He missed appt. He did complete " "renal artery US. He continues to follow GI clinic for cirrhosis. Scheduled to f/u 5/23/22. Reports has remained abstinent from ETOH use. At this time, he denies any acute s/s of hepatic decompensation. Pt compliant w/ meds except for ASA because "somebody said it's not good to take aspirin every day!" He denies any bleeding concerns. Previously referred to Uro for MSH. Last referral placed 4/2021. He states that he never received an appt. Denies gross hematuria. No acute concerns.    (9/26/2022): Mr. Osman is a 63 yo male with a PmHx of HTN, HLD (not taking any antihyperlipidemics per pt preference), cirrhosis of the liver (following GI), esophageal varices, h/o ETOH abuse, colon polyps (3 adenomatous polyps in sigmoid colon 2019, repeat 3 yrs), prediabetes, MSH, diverticulosis, left renal artery aneurysm, and Tobacco Use, presenting for f/u. Labs stable compared to personal baseline, aside from triglycerides 245. States had a piece of sausage prior to analysis. Patient was scheduled in Vascular Clinic on 5/3/22 due to h/o renal artery aneurysm. Noted stable on US. He will return in 2023 with annual US. He continues to follow GI clinic for cirrhosis. F/u 5/23/22. Reports has remained abstinent from ETOH use. At this time, he denies any acute s/s of hepatic decompensation and liver function tests are WNL. He defers vaccines. No other concerns today.    Today's Visit (3/27/2023): Mr. Osman is a 63 yo male with a PmHx of HTN, HLD (not taking any antihyperlipidemics per pt preference), cirrhosis of the liver (following GI), esophageal varices, h/o ETOH abuse, colon polyps (3 adenomatous polyps in sigmoid colon 2019, repeat 3 yrs), prediabetes, MSH, diverticulosis, left renal artery aneurysm (follows [facility]'s Vascular clinic), and Tobacco Use, presenting for f/u. Labs done earlier this month. Overall stable compared to personal baseline. Platelets were slightly decreased and calcium slightly elevated. Follows " Vascular Clinic due to h/o renal artery aneurysm. [facility] US scheduled this Friday. He continues to follow GI clinic for cirrhosis. Last visit December 2022. He will undergo repeat colonoscopy and EGD May 2023. Reports has remained abstinent from ETOH use. At this time, he denies any acute s/s of hepatic decompensation such as edema, changes in LOC/mentation, SOB, N/V, abd complaints, and liver function tests are WNL. He defers vaccines. Needs refill on BP medication. No other concerns today.      Review of Systems     Review of Systems   Constitutional: Negative.    HENT: Negative.     Eyes: Negative.    Respiratory: Negative.     Cardiovascular: Negative.    Gastrointestinal: Negative.  Negative for abdominal distention, abdominal pain, anal bleeding, blood in stool, constipation, diarrhea, nausea and vomiting.   Endocrine: Negative.    Genitourinary: Negative.    Musculoskeletal: Negative.    Skin: Negative.    Allergic/Immunologic: Negative.    Neurological: Negative.    Hematological: Negative.    Psychiatric/Behavioral: Negative.       Medical / Social / Family History     Past Medical History:   Diagnosis Date    Elevated liver enzymes     Hypertension     Microhematuria     Positive FIT (fecal immunochemical test)     Prediabetes     Tobacco user     Unspecified cirrhosis of liver        Past Surgical History:   Procedure Laterality Date    COLONOSCOPY      ESOPHAGOGASTRODUODENOSCOPY      LIVER BIOPSY      POLYPECTOMY      TENDON REPAIR Right     foot       Social History    reports that he has been smoking cigarettes. He has a 5.00 pack-year smoking history. He uses smokeless tobacco. He reports that he does not currently use alcohol. He reports that he does not use drugs.    Family History  's family history includes Dementia in his father; Liver cancer in his brother; Lung cancer in his mother; Stroke in his father.    Medications and Allergies     Medications  Medication List with  Changes/Refills   Current Medications    ASPIRIN (ECOTRIN) 81 MG EC TABLET    Take 81 mg by mouth every other day.    MULTIVITAMIN WITH IRON TAB    Take 1 tablet by mouth once daily.    OMEGA-3 FATTY ACIDS/FISH OIL (FISH OIL-OMEGA-3 FATTY ACIDS) 300-1,000 MG CAPSULE    Take 1 capsule by mouth once daily.   Changed and/or Refilled Medications    Modified Medication Previous Medication    LISINOPRIL-HYDROCHLOROTHIAZIDE (PRINZIDE,ZESTORETIC) 20-25 MG TAB lisinopriL-hydrochlorothiazide (PRINZIDE,ZESTORETIC) 20-25 mg Tab       Take 1 tablet by mouth once daily.    Take 1 tablet by mouth once daily.       Allergies  Review of patient's allergies indicates:  No Known Allergies    Physical Examination     Vitals:    03/27/23 0723   BP: 121/74   Pulse: 73   Resp: 20   Temp: 98.1 °F (36.7 °C)     Physical Exam  Constitutional:       Appearance: Normal appearance. He is obese.   HENT:      Head: Normocephalic and atraumatic.   Eyes:      Extraocular Movements: Extraocular movements intact.      Conjunctiva/sclera: Conjunctivae normal.      Pupils: Pupils are equal, round, and reactive to light.   Neck:      Vascular: No carotid bruit.   Cardiovascular:      Rate and Rhythm: Normal rate and regular rhythm.      Pulses: Normal pulses.      Heart sounds: Normal heart sounds.   Pulmonary:      Effort: Pulmonary effort is normal.      Breath sounds: Normal breath sounds.   Abdominal:      General: Bowel sounds are normal.      Palpations: Abdomen is soft.   Musculoskeletal:         General: Normal range of motion.      Cervical back: Normal range of motion.      Right lower leg: No edema.      Left lower leg: No edema.   Skin:     General: Skin is warm and dry.   Neurological:      General: No focal deficit present.      Mental Status: He is alert and oriented to person, place, and time.   Psychiatric:         Mood and Affect: Mood normal.         Behavior: Behavior normal.         Thought Content: Thought content normal.          Judgment: Judgment normal.         Results     Lab Results   Component Value Date    WBC 7.4 03/07/2023    RBC 4.91 03/07/2023    HGB 15.2 03/07/2023    HCT 43.8 03/07/2023    MCV 89.2 03/07/2023    MCH 31.0 03/07/2023    MCHC 34.7 03/07/2023    RDW 13.8 03/07/2023     (L) 03/07/2023    MPV 9.9 03/07/2023     CMP  Sodium Level   Date Value Ref Range Status   03/07/2023 137 136 - 145 mmol/L Final     Potassium Level   Date Value Ref Range Status   03/07/2023 3.9 3.5 - 5.1 mmol/L Final     Carbon Dioxide   Date Value Ref Range Status   03/07/2023 25 23 - 31 mmol/L Final     Blood Urea Nitrogen   Date Value Ref Range Status   03/07/2023 13.9 8.4 - 25.7 mg/dL Final     Creatinine   Date Value Ref Range Status   03/07/2023 0.78 0.73 - 1.18 mg/dL Final     Calcium Level Total   Date Value Ref Range Status   03/07/2023 10.7 (H) 8.8 - 10.0 mg/dL Final     Albumin Level   Date Value Ref Range Status   03/07/2023 3.9 3.4 - 4.8 g/dL Final     Bilirubin Total   Date Value Ref Range Status   03/07/2023 0.6 <=1.5 mg/dL Final     Alkaline Phosphatase   Date Value Ref Range Status   03/07/2023 48 40 - 150 unit/L Final     Aspartate Aminotransferase   Date Value Ref Range Status   03/07/2023 23 5 - 34 unit/L Final     Alanine Aminotransferase   Date Value Ref Range Status   03/07/2023 22 0 - 55 unit/L Final     Estimated GFR-Non    Date Value Ref Range Status   03/31/2022 >105 >=90      Lab Results   Component Value Date    CHOL 184 09/23/2022     Lab Results   Component Value Date    HDL 26 (L) 09/23/2022     No results found for: LDLCALC  Lab Results   Component Value Date    TRIG 245 (H) 09/23/2022     No results found for: CHOLHDL  Lab Results   Component Value Date    TSH 1.7876 09/23/2022     Lab Results   Component Value Date    PHUR 6.5 10/14/2021    PROTEINUA Negative 03/07/2023    GLUCUA Negative 10/14/2021    KETONESU Negative 10/14/2021    OCCULTUA 0.03 (A) 10/14/2021    NITRITE Negative 10/14/2021     LEUKOCYTESUR 25 (A) 03/07/2023           Assessment and Plan (including Health Maintenance)     Plan:         Health Maintenance Due   Topic Date Due    Colorectal Cancer Screening  Never done    COVID-19 Vaccine (4 - Booster for Pfizer series) 01/04/2022       Problem List Items Addressed This Visit          Cardiac/Vascular    Hypertension    Current Assessment & Plan     At goal  Continue Lisinopril-HCTZ 20-25 mg po daily  DASH         Relevant Medications    lisinopriL-hydrochlorothiazide (PRINZIDE,ZESTORETIC) 20-25 mg Tab       Renal/    Microscopic hematuria - Primary    Overview     3/7/23:   Final Diagnosis      Urine:  - Negative.  - No evidence of malignancy.                 Current Assessment & Plan     Re-refer to Uro         Relevant Orders    Ambulatory referral/consult to Urology    Urinalysis, Reflex to Urine Culture    Hypercalcemia    Relevant Orders    Comprehensive Metabolic Panel    Vitamin D       GI    Liver cirrhosis secondary to ROCHA (nonalcoholic steatohepatitis)    Current Assessment & Plan     Following Cirrhosis Clinic. Last visit 12/2022  Abd US 3/2023, stable  Repeat EGD May 2023  Continue abstinence from ETOH            Other    Tobacco user    Current Assessment & Plan     > 3 min spent on cessation          Other Visit Diagnoses       Wellness examination        Relevant Orders    TSH    Lipid Panel    Comprehensive Metabolic Panel    CBC Auto Differential    Screening for prostate cancer        Relevant Orders    PSA, Screening    Screening for diabetes mellitus        Relevant Orders    Hemoglobin A1C              Health Maintenance Topics with due status: Not Due       Topic Last Completion Date    TETANUS VACCINE 07/12/2021    Hemoglobin A1c (Diabetic Prevention Screening) 09/23/2022    Lipid Panel 09/23/2022       Future Appointments   Date Time Provider Department Center   3[facility]31[facility]2023  [facility] [place]   5/9/2023  2:00 PM PROVIDER, [facility] VASCULAR  SURGERY [facility] VASSUR [place]   6/19/2023  2:30 PM Jeromy Olivia MD [facility] GASTRO [place]   9/27/2023  7:30 AM BAIRON Morton [facility] INTMED [place]            Signature:  BAIRON Morton  [facility]  [address] [facility] [address]    Date of encounter: 3/27/23

## 2023-03-31 ENCOUNTER — HOSPITAL ENCOUNTER (OUTPATIENT)
Dept: RADIOLOGY | Facility: HOSPITAL | Age: 63
Discharge: HOME | End: 2023-03-31
Attending: STUDENT IN AN ORGANIZED HEALTH CARE EDUCATION/TRAINING PROGRAM
Payer: MEDICAID

## 2023-03-31 DIAGNOSIS — I72.2 RENAL ARTERY ANEURYSM: ICD-10-CM

## 2023-03-31 PROCEDURE — 93975 VASCULAR STUDY: CPT | Mod: TC

## 2023-05-11 ENCOUNTER — OFFICE VISIT (OUTPATIENT)
Dept: UROLOGY | Facility: CLINIC | Age: 63
End: 2023-05-11
Payer: MEDICAID

## 2023-05-11 VITALS
DIASTOLIC BLOOD PRESSURE: 82 MMHG | HEART RATE: 73 BPM | HEIGHT: 67 IN | SYSTOLIC BLOOD PRESSURE: 134 MMHG | BODY MASS INDEX: 30.55 KG/M2 | WEIGHT: 194.63 LBS | TEMPERATURE: 98 F | RESPIRATION RATE: 18 BRPM

## 2023-05-11 DIAGNOSIS — R31.21 ASYMPTOMATIC MICROSCOPIC HEMATURIA: Primary | ICD-10-CM

## 2023-05-11 DIAGNOSIS — N28.89 RENAL MASS: ICD-10-CM

## 2023-05-11 LAB
BILIRUB SERPL-MCNC: NEGATIVE MG/DL
BLOOD URINE, POC: NORMAL
COLOR, POC UA: YELLOW
GLUCOSE UR QL STRIP: NEGATIVE
KETONES UR QL STRIP: NORMAL
LEUKOCYTE ESTERASE URINE, POC: NORMAL
NITRITE, POC UA: NEGATIVE
PH, POC UA: 7
PROTEIN, POC: NEGATIVE
SPECIFIC GRAVITY, POC UA: 1.02
UROBILINOGEN, POC UA: 0.2

## 2023-05-11 PROCEDURE — 4010F PR ACE/ARB THEARPY RXD/TAKEN: ICD-10-PCS | Mod: CPTII,,, | Performed by: UROLOGY

## 2023-05-11 PROCEDURE — 4010F ACE/ARB THERAPY RXD/TAKEN: CPT | Mod: CPTII,,, | Performed by: UROLOGY

## 2023-05-11 PROCEDURE — 81001 URINALYSIS AUTO W/SCOPE: CPT | Mod: PBBFAC | Performed by: UROLOGY

## 2023-05-11 PROCEDURE — 3008F PR BODY MASS INDEX (BMI) DOCUMENTED: ICD-10-PCS | Mod: CPTII,,, | Performed by: UROLOGY

## 2023-05-11 PROCEDURE — 99214 OFFICE O/P EST MOD 30 MIN: CPT | Mod: PBBFAC | Performed by: UROLOGY

## 2023-05-11 PROCEDURE — 3079F DIAST BP 80-89 MM HG: CPT | Mod: CPTII,,, | Performed by: UROLOGY

## 2023-05-11 PROCEDURE — 3075F SYST BP GE 130 - 139MM HG: CPT | Mod: CPTII,,, | Performed by: UROLOGY

## 2023-05-11 PROCEDURE — 99204 OFFICE O/P NEW MOD 45 MIN: CPT | Mod: S$PBB,,, | Performed by: UROLOGY

## 2023-05-11 PROCEDURE — 3008F BODY MASS INDEX DOCD: CPT | Mod: CPTII,,, | Performed by: UROLOGY

## 2023-05-11 PROCEDURE — 3075F PR MOST RECENT SYSTOLIC BLOOD PRESS GE 130-139MM HG: ICD-10-PCS | Mod: CPTII,,, | Performed by: UROLOGY

## 2023-05-11 PROCEDURE — 3079F PR MOST RECENT DIASTOLIC BLOOD PRESSURE 80-89 MM HG: ICD-10-PCS | Mod: CPTII,,, | Performed by: UROLOGY

## 2023-05-11 PROCEDURE — 88108 CYTOPATH CONCENTRATE TECH: CPT | Mod: TC | Performed by: UROLOGY

## 2023-05-11 PROCEDURE — 99204 PR OFFICE/OUTPT VISIT, NEW, LEVL IV, 45-59 MIN: ICD-10-PCS | Mod: S$PBB,,, | Performed by: UROLOGY

## 2023-05-11 NOTE — PROGRESS NOTES
Patient seen by Dr. Ordoñez. Patient to return to clinic for a cystoscope after CT Scan. Urine collected and sent to the lab.

## 2023-05-12 LAB
ESTROGEN SERPL-MCNC: NORMAL PG/ML
INSULIN SERPL-ACNC: NORMAL U[IU]/ML
LAB AP CLINICAL INFORMATION: NORMAL
LAB AP GROSS DESCRIPTION: NORMAL

## 2023-05-16 ENCOUNTER — ANESTHESIA EVENT (OUTPATIENT)
Dept: ENDOSCOPY | Facility: HOSPITAL | Age: 63
End: 2023-05-16
Payer: MEDICAID

## 2023-05-16 NOTE — ANESTHESIA PREPROCEDURE EVALUATION
05/16/2023  Dawson Guerra is a 62 y.o., male for CLN screening        Vitals:    05/18/23 0853 05/18/23 0858 05/18/23 0908 05/18/23 0927   BP: 114/77 113/69 129/83 127/82   Pulse: 86 77 65 69   Resp:  (!) 22 (!) 22 (!) 21   Temp:       SpO2: 97% 96% 98% 97%   Weight:       Height:         History of CLN polyps    Active Ambulatory Problems     Diagnosis Date Noted    Liver cirrhosis secondary to ROCHA (nonalcoholic steatohepatitis) 05/23/2022    Hypertension 09/26/2022    Microscopic hematuria 09/26/2022    Obesity 09/26/2022    Tobacco user 09/26/2022    Hypercalcemia 03/27/2023     Resolved Ambulatory Problems     Diagnosis Date Noted    Wellness examination 09/26/2022     Past Medical History:   Diagnosis Date    Elevated liver enzymes     Microhematuria     Positive FIT (fecal immunochemical test)     Prediabetes     Unspecified cirrhosis of liver      Past Surgical History:   Procedure Laterality Date    COLONOSCOPY      ESOPHAGOGASTRODUODENOSCOPY      LIVER BIOPSY      POLYPECTOMY      TENDON REPAIR Right     foot   .    Lab Results   Component Value Date    WBC 7.4 03/07/2023    HGB 15.2 03/07/2023    HCT 43.8 03/07/2023     (L) 03/07/2023    CHOL 184 09/23/2022    TRIG 245 (H) 09/23/2022    HDL 26 (L) 09/23/2022    ALT 22 03/07/2023    AST 23 03/07/2023     03/07/2023    K 3.9 03/07/2023    CREATININE 0.78 03/07/2023    BUN 13.9 03/07/2023    CO2 25 03/07/2023    TSH 1.7876 09/23/2022    PSA 0.77 09/23/2022    INR 1.04 03/07/2023    HGBA1C 4.5 09/23/2022     Pre-op Assessment    I have reviewed the Patient Summary Reports.     I have reviewed the Nursing Notes. I have reviewed the NPO Status.   I have reviewed the Medications.     Review of Systems  Anesthesia Hx:  No problems with previous Anesthesia  History of prior surgery of interest to airway management or  planning: Denies Family Hx of Anesthesia complications.   Denies Personal Hx of Anesthesia complications.   Hematology/Oncology:  Hematology Normal   Oncology Normal     EENT/Dental:EENT/Dental Normal   Cardiovascular:  Cardiovascular Normal     Pulmonary:  Pulmonary Normal    Renal/:  Renal/ Normal     Hepatic/GI:  Hepatic/GI Normal    Musculoskeletal:  Musculoskeletal Normal    Neurological:  Neurology Normal    Endocrine:  Endocrine Normal    Dermatological:  Skin Normal    Psych:  Psychiatric Normal           Physical Exam  General: Well nourished, Cooperative, Alert and Oriented    Airway:  Mallampati: I / I  Mouth Opening: Normal  TM Distance: Normal  Tongue: Normal  Neck ROM: Normal ROM    Dental:  Intact, Loose teeth  Decaying dentition noted at multiple sites -- patient is aware of risk of damage and dislodgment          Anesthesia Plan  Type of Anesthesia, risks & benefits discussed:    Anesthesia Type: Gen Natural Airway  Intra-op Monitoring Plan: Standard ASA Monitors  Post Op Pain Control Plan: IV/PO Opioids PRN  (medical reason for not using multimodal pain management)  Induction:  IV  Informed Consent: Informed consent signed with the Patient and all parties understand the risks and agree with anesthesia plan.  All questions answered. Patient consented to blood products? No  ASA Score: 3  Day of Surgery Review of History & Physical: H&P Update referred to the surgeon/provider.    Ready For Surgery From Anesthesia Perspective.     .

## 2023-05-18 ENCOUNTER — HOSPITAL ENCOUNTER (OUTPATIENT)
Facility: HOSPITAL | Age: 63
Discharge: HOME | End: 2023-05-18
Attending: INTERNAL MEDICINE | Admitting: INTERNAL MEDICINE
Payer: MEDICAID

## 2023-05-18 ENCOUNTER — ANESTHESIA (OUTPATIENT)
Dept: ENDOSCOPY | Facility: HOSPITAL | Age: 63
End: 2023-05-18
Payer: MEDICAID

## 2023-05-18 VITALS
WEIGHT: 192 LBS | HEART RATE: 69 BPM | SYSTOLIC BLOOD PRESSURE: 127 MMHG | OXYGEN SATURATION: 97 % | DIASTOLIC BLOOD PRESSURE: 82 MMHG | HEIGHT: 69 IN | TEMPERATURE: 98 F | BODY MASS INDEX: 28.44 KG/M2 | RESPIRATION RATE: 21 BRPM

## 2023-05-18 DIAGNOSIS — Z86.010 HX OF COLONIC POLYPS: ICD-10-CM

## 2023-05-18 DIAGNOSIS — D12.8 ADENOMATOUS RECTAL POLYP: Primary | ICD-10-CM

## 2023-05-18 DIAGNOSIS — K63.5 CECAL POLYP: ICD-10-CM

## 2023-05-18 DIAGNOSIS — D12.4 ADENOMATOUS POLYP OF DESCENDING COLON: ICD-10-CM

## 2023-05-18 DIAGNOSIS — D12.2 ADENOMATOUS POLYP OF ASCENDING COLON: ICD-10-CM

## 2023-05-18 PROCEDURE — 45378 DIAGNOSTIC COLONOSCOPY: CPT | Mod: ,,, | Performed by: INTERNAL MEDICINE

## 2023-05-18 PROCEDURE — 63600175 PHARM REV CODE 636 W HCPCS: Performed by: NURSE ANESTHETIST, CERTIFIED REGISTERED

## 2023-05-18 PROCEDURE — D9220A PRA ANESTHESIA: ICD-10-PCS | Mod: ,,, | Performed by: NURSE ANESTHETIST, CERTIFIED REGISTERED

## 2023-05-18 PROCEDURE — 88305 TISSUE EXAM BY PATHOLOGIST: CPT | Mod: TC | Performed by: INTERNAL MEDICINE

## 2023-05-18 PROCEDURE — 37000009 HC ANESTHESIA EA ADD 15 MINS: Performed by: INTERNAL MEDICINE

## 2023-05-18 PROCEDURE — 25000003 PHARM REV CODE 250: Performed by: NURSE ANESTHETIST, CERTIFIED REGISTERED

## 2023-05-18 PROCEDURE — 27201423 OPTIME MED/SURG SUP & DEVICES STERILE SUPPLY: Performed by: INTERNAL MEDICINE

## 2023-05-18 PROCEDURE — 00811 ANES LWR INTST NDSC NOS: CPT | Performed by: INTERNAL MEDICINE

## 2023-05-18 PROCEDURE — 45378 PR COLONOSCOPY,DIAGNOSTIC: ICD-10-PCS | Mod: ,,, | Performed by: INTERNAL MEDICINE

## 2023-05-18 PROCEDURE — 37000008 HC ANESTHESIA 1ST 15 MINUTES: Performed by: INTERNAL MEDICINE

## 2023-05-18 PROCEDURE — D9220A PRA ANESTHESIA: Mod: ,,, | Performed by: NURSE ANESTHETIST, CERTIFIED REGISTERED

## 2023-05-18 PROCEDURE — 63600175 PHARM REV CODE 636 W HCPCS: Performed by: SPECIALIST

## 2023-05-18 PROCEDURE — 45378 DIAGNOSTIC COLONOSCOPY: CPT | Performed by: INTERNAL MEDICINE

## 2023-05-18 RX ORDER — PROPOFOL 10 MG/ML
INJECTION, EMULSION INTRAVENOUS
Status: DISCONTINUED | OUTPATIENT
Start: 2023-05-18 | End: 2023-05-18

## 2023-05-18 RX ORDER — LIDOCAINE HYDROCHLORIDE 20 MG/ML
INJECTION INTRAVENOUS
Status: DISCONTINUED | OUTPATIENT
Start: 2023-05-18 | End: 2023-05-18

## 2023-05-18 RX ORDER — SODIUM CHLORIDE, SODIUM LACTATE, POTASSIUM CHLORIDE, CALCIUM CHLORIDE 600; 310; 30; 20 MG/100ML; MG/100ML; MG/100ML; MG/100ML
INJECTION, SOLUTION INTRAVENOUS CONTINUOUS
Status: DISCONTINUED | OUTPATIENT
Start: 2023-05-18 | End: 2023-05-18 | Stop reason: HOSPADM

## 2023-05-18 RX ADMIN — PROPOFOL 200 MG: 10 INJECTION, EMULSION INTRAVENOUS at 08:05

## 2023-05-18 RX ADMIN — SODIUM CHLORIDE, POTASSIUM CHLORIDE, SODIUM LACTATE AND CALCIUM CHLORIDE: 600; 310; 30; 20 INJECTION, SOLUTION INTRAVENOUS at 07:05

## 2023-05-18 RX ADMIN — LIDOCAINE HYDROCHLORIDE 50 MG: 20 INJECTION INTRAVENOUS at 08:05

## 2023-05-18 NOTE — H&P
Colonoscopy History and Physical    Patient Name: Dawson Guerra  MRN: [id number]  : 1960  Date of Procedure:  2023  Referring Physician: Saravanan Andersen MD  Primary Physician: BAIRON Morton  Procedure Physician: Saravanan Andersen MD     Procedure - Colonoscopy  ASA - per anesthesia  Mallampati - per anesthesia  History of Anesthesia problems - no  Family history Anesthesia problems -  no   Plan of anesthesia - General    Diagnosis: previous adenomatous polyp  Chief Complaint: Same as above    HPI: Patient is an 62 y.o. male is here for the above.  The patient has a history of adenomatous colon polyps.  He states that he had a colonoscopy a year or 2 ago and was told that indeed to return in 3 years for a follow-up, records indicate that he was actually here in 2019 for that colonoscopy.  He is had no lower GI symptoms since that time.  His appetite is good, his weight is stable.  His bowels are functioning normally and he is had no overt rectal bleeding.  There is no family history of colon polyps, colon cancer or colitis.    Last colonoscopy:  2019   Family history:  No  Anticoagulation:  No    ROS:  Constitutional: No fevers, chills, No weight loss  CV: No chest pain  Pulm: No cough, No shortness of breath  GI: see HPI    Medical History:   Past Medical History:   Diagnosis Date    Elevated liver enzymes     Hypertension     Microhematuria     Positive FIT (fecal immunochemical test)     Prediabetes     Tobacco user     Unspecified cirrhosis of liver          Surgical History:   Past Surgical History:   Procedure Laterality Date    COLONOSCOPY      ESOPHAGOGASTRODUODENOSCOPY      LIVER BIOPSY      POLYPECTOMY      TENDON REPAIR Right     foot       Family History:   Family History   Problem Relation Age of Onset    Lung cancer Mother     Stroke Father     Dementia Father     Liver cancer Brother    .    Social History:   Social History     Socioeconomic History    Marital status: Single    Tobacco Use    Smoking status: Every Day     Packs/day: 0.50     Years: 10.00     Pack years: 5.00     Types: Cigarettes    Smokeless tobacco: Current   Substance and Sexual Activity    Alcohol use: Not Currently    Drug use: Never    Sexual activity: Not Currently     Social Determinants of Health     Financial Resource Strain: Medium Risk    Difficulty of Paying Living Expenses: Somewhat hard   Food Insecurity: No Food Insecurity    Worried About Running Out of Food in the Last Year: Never true    Ran Out of Food in the Last Year: Never true   Transportation Needs: No Transportation Needs    Lack of Transportation (Medical): No    Lack of Transportation (Non-Medical): No   Physical Activity: Insufficiently Active    Days of Exercise per Week: 2 days    Minutes of Exercise per Session: 60 min   Stress: Stress Concern Present    Feeling of Stress : Rather much   Social Connections: Socially Isolated    Frequency of Communication with Friends and Family: Twice a week    Frequency of Social Gatherings with Friends and Family: Once a week    Attends Gnosticist Services: Never    Active Member of Clubs or Organizations: No    Attends Club or Organization Meetings: Never    Marital Status: Never    Housing Stability: Low Risk     Unable to Pay for Housing in the Last Year: No    Number of Places Lived in the Last Year: 1    Unstable Housing in the Last Year: No       Review of patient's allergies indicates:  No Known Allergies    Medications:   Medications Prior to Admission   Medication Sig Dispense Refill Last Dose    aspirin (ECOTRIN) 81 MG EC tablet Take 81 mg by mouth every other day.   5/17/2023    lisinopriL-hydrochlorothiazide (PRINZIDE,ZESTORETIC) 20-25 mg Tab Take 1 tablet by mouth once daily. 90 tablet 1 5/17/2023    multivitamin with iron Tab Take 1 tablet by mouth once daily.   5/17/2023    omega-3 fatty acids/fish oil (FISH OIL-OMEGA-3 FATTY ACIDS) 300-1,000 mg capsule Take 1 capsule by mouth once  "daily.   5/17/2023         Physical Exam:    Vital Signs:   Vitals:    05/18/23 0715   BP: 117/75   Pulse: 70   Resp: 18   Temp: 97.7 °F (36.5 °C)     /75   Pulse 70   Temp 97.7 °F (36.5 °C)   Resp 18   Ht 5' 9" (1.753 m)   Wt 87.1 kg (192 lb)   SpO2 95%   BMI 28.35 kg/m²     General:          Well appearing in no acute distress  Lungs: Clear to auscultation bilaterally, respirations unlabored  Heart: Regular rate and rhythm, S1 and S2 normal, no obvious murmurs  Abdomen:         Soft, non-tender, bowel sounds normal, no masses, no organomegaly        Labs:  Lab Results   Component Value Date    WBC 7.4 03/07/2023    HGB 15.2 03/07/2023    HCT 43.8 03/07/2023    MCV 89.2 03/07/2023     (L) 03/07/2023     Lab Results   Component Value Date    INR 1.04 03/07/2023     Lab Results   Component Value Date     03/07/2023    K 3.9 03/07/2023    CO2 25 03/07/2023    BUN 13.9 03/07/2023    CREATININE 0.78 03/07/2023    LABPROT 8.2 (H) 03/07/2023    ALBUMIN 3.9 03/07/2023    BILITOT 0.6 03/07/2023    ALKPHOS 48 03/07/2023    ALT 22 03/07/2023    AST 23 03/07/2023         Assessment and Plan:    History reviewed, vital signs satisfactory, cardiopulmonary status satisfactory.  I have explained the sedation options, risks, benefits, and alternatives of this endoscopic procedure to the patient including but not limited to bleeding, inflammation, infection, perforation, and death.  All questions were answered and the patient consented to proceed with procedure as planned.   The patient is deemed an appropriate candidate for the sedation as planned.      Saravanan Andersen MD   of Clinical Medicine  Gastroenterology and Hepatology  [facility]    5/18/2023  7:55 AM     "

## 2023-05-18 NOTE — PROVATION PATIENT INSTRUCTIONS
Discharge Summary/Instructions after an Endoscopic Procedure  Patient Name: Dawson Guerra  Patient MRN: [id number]  Patient YOB: 1960  Thursday, May 18, 2023  Saravanan Andersen MD  Dear patient,  As a result of recent federal legislation (The Federal Cures Act), you may   receive lab or pathology results from your procedure in your [facility]   account before your physician is able to contact you. Your physician or   their representative will relay the results to you with their   recommendations at their soonest availability.  Thank you,  RESTRICTIONS:  During your procedure today, you received medications for sedation.  These   medications may affect your judgment, balance and coordination.  Therefore,   for 24 hours, you have the following restrictions:   - DO NOT drive a car, operate machinery, make legal/financial decisions,   sign important papers or drink alcohol.    ACTIVITY:  Today: no heavy lifting, straining or running due to procedural   sedation/anesthesia.  The following day: return to full activity including work.  DIET:  Eat and drink normally unless instructed otherwise.     TREATMENT FOR COMMON SIDE EFFECTS:  - Mild abdominal pain, nausea, belching, bloating or excessive gas:  rest,   eat lightly and use a heating pad.  - Sore Throat: treat with throat lozenges and/or gargle with warm salt   water.  - Because air was used during the procedure, expelling large amounts of air   from your rectum or belching is normal.  - If a bowel prep was taken, you may not have a bowel movement for 1-3 days.    This is normal.  SYMPTOMS TO WATCH FOR AND REPORT TO YOUR PHYSICIAN:  1. Abdominal pain or bloating, other than gas cramps.  2. Chest pain.  3. Back pain.  4. Signs of infection such as: chills or fever occurring within 24 hours   after the procedure.  5. Rectal bleeding, which would show as bright red, maroon, or black stools.   (A tablespoon of blood from the rectum is not serious,  especially if   hemorrhoids are present.)  6. Vomiting.  7. Weakness or dizziness.  GO DIRECTLY TO THE NEAREST EMERGENCY ROOM IF YOU HAVE ANY OF THE FOLLOWING:      Difficulty breathing              Chills and/or fever over 101 F   Persistent vomiting and/or vomiting blood   Severe abdominal pain   Severe chest pain   Black, tarry stools   Bleeding- more than one tablespoon   Any other symptom or condition that you feel may need urgent attention  Your doctor recommends these additional instructions:  If any biopsies were taken, your doctors clinic will contact you in 1 to 2   weeks with any results.  - Discharge patient to home (ambulatory).   - Repeat colonoscopy in 3 years for surveillance.  For questions, problems or results please call your physician - Saravanan Andersen MD at Work:  [phone].  [facility] , EMERGENCY ROOM PHONE NUMBER: [phone]  IF A COMPLICATION OR EMERGENCY SITUATION ARISES AND YOU ARE UNABLE TO REACH   YOUR PHYSICIAN - GO DIRECTLY TO THE EMERGENCY ROOM.  MD Saravanan Pacheco MD  5/18/2023 8:51:55 AM  This report has been verified and signed electronically.  Dear patient,  As a result of recent federal legislation (The Federal Cures Act), you may   receive lab or pathology results from your procedure in your [facility]   account before your physician is able to contact you. Your physician or   their representative will relay the results to you with their   recommendations at their soonest availability.  Thank you,  PROVATION

## 2023-05-18 NOTE — ANESTHESIA POSTPROCEDURE EVALUATION
Anesthesia Post Evaluation    Patient: Dawson Guerra    Procedure(s) Performed: Procedure(s) (LRB):  COLONOSCOPY, WITH POLYPECTOMY USING SNARE (N/A)    Final Anesthesia Type: general      Patient location during evaluation: GI PACU  Patient participation: Yes- Able to Participate  Level of consciousness: awake and alert  Post-procedure vital signs: reviewed and stable  Pain management: adequate  Airway patency: patent    PONV status at discharge: No PONV  Anesthetic complications: no      Cardiovascular status: blood pressure returned to baseline  Respiratory status: unassisted  Hydration status: euvolemic  Follow-up not needed.          Vitals Value Taken Time   /75 05/18/23 0715   Temp 36.5 °C (97.7 °F) 05/18/23 0715   Pulse 70 05/18/23 0715   Resp 18 05/18/23 0715   SpO2 95 % 05/18/23 0715         No case tracking events are documented in the log.      Pain/Nalini Score: No data recorded

## 2023-05-18 NOTE — TRANSFER OF CARE
"Anesthesia Transfer of Care Note    Patient: Dawson Guerra    Procedure(s) Performed: Procedure(s) (LRB):  COLONOSCOPY, WITH POLYPECTOMY USING SNARE (N/A)    Patient location: GI    Anesthesia Type: general    Transport from OR: Transported from OR on room air with adequate spontaneous ventilation    Post pain: adequate analgesia    Post assessment: no apparent anesthetic complications    Post vital signs: stable    Level of consciousness: awake    Nausea/Vomiting: no nausea/vomiting    Transfer of care protocol was followed      Last vitals:   Visit Vitals  /75   Pulse 70   Temp 36.5 °C (97.7 °F)   Resp 18   Ht 5' 9" (1.753 m)   Wt 87.1 kg (192 lb)   SpO2 95%   BMI 28.35 kg/m²     "

## 2023-05-18 NOTE — DISCHARGE SUMMARY
[facility] - Endoscopy  Discharge Note  Short Stay    Procedure(s) (LRB):  COLONOSCOPY, WITH POLYPECTOMY USING SNARE (N/A)  After consent was obtained the patient was transferred to the endoscopy suite.  General IV anesthesia was provided by anesthesia Services.  Rectal exam was normal.  The Olympus videocolonoscope was introduced per rectum and advanced around the colon to the cecum.  In the cecal cap near the appendiceal orifice a 3 mm sessile polyp was identified and removed with cold snare.  The remainder of the cecum and ileocecal valve were normal.  In the proximal ascending colon a 4 mm sessile polyp was identified and removed with cold snare, the remainder of the ascending colon was normal.  The transverse colon was normal.  There was a 3 mm sessile polyp in the mid descending colon removed with cold snare.  The sigmoid colon was unremarkable other than a tattoo which was otherwise unremarkable.  There were 3 polyps in the rectum ranging in size from 2-4 mm, all removed with cold snare.  The endoscope was withdrawn.  Withdrawal time cecum to rectum 24 minutes.      Discharge plan the patient will resume his normal diet today and normal activities tomorrow.  A follow-up colonoscopy in 3 years is recommended.    OUTCOME: Patient tolerated treatment/procedure well without complication and is now ready for discharge.    DISPOSITION: Home or Self Care    FINAL DIAGNOSIS:  <principal problem not specified>    FOLLOWUP: In clinic    DISCHARGE INSTRUCTIONS:    Discharge Procedure Orders   Diet general     Call MD for:  temperature >100.4     Call MD for:  persistent nausea and vomiting     Call MD for:  severe uncontrolled pain     Call MD for:  difficulty breathing, headache or visual disturbances     Activity as tolerated         Clinical Reference Documents Added to Patient Instructions         Document    COLONOSCOPY DISCHARGE INSTRUCTIONS (ENGLISH)            TIME SPENT ON DISCHARGE: 10 minutes

## 2023-05-19 LAB
ESTROGEN SERPL-MCNC: NORMAL PG/ML
INSULIN SERPL-ACNC: NORMAL U[IU]/ML
LAB AP CLINICAL INFORMATION: NORMAL
LAB AP GROSS DESCRIPTION: NORMAL
LAB AP REPORT FOOTNOTES: NORMAL
T3RU NFR SERPL: NORMAL %

## 2023-05-22 ENCOUNTER — TELEPHONE (OUTPATIENT)
Dept: ENDOSCOPY | Facility: HOSPITAL | Age: 63
End: 2023-05-22
Payer: MEDICAID

## 2023-05-22 NOTE — TELEPHONE ENCOUNTER
----- Message from Saravanan Andersen MD sent at 5/22/2023  7:13 AM CDT -----  Please let patient know that polyps removed were adenoma polyps.   These types of polyps are not cancer, but they are pre-cancerous (meaning that they can turn into cancers). Removing the polyp removes the risk of it becoming cancer.  Repeat colonoscopy is recommended in 5 years.

## 2023-05-30 ENCOUNTER — HOSPITAL ENCOUNTER (OUTPATIENT)
Dept: RADIOLOGY | Facility: HOSPITAL | Age: 63
Discharge: HOME | End: 2023-05-30
Attending: UROLOGY
Payer: MEDICAID

## 2023-05-30 DIAGNOSIS — R31.21 ASYMPTOMATIC MICROSCOPIC HEMATURIA: ICD-10-CM

## 2023-05-30 LAB
CREAT SERPL-MCNC: 0.84 MG/DL (ref 0.73–1.18)
GFR SERPLBLD CREATININE-BSD FMLA CKD-EPI: >60 MLS/MIN/1.73/M2

## 2023-05-30 PROCEDURE — 74178 CT ABD&PLV WO CNTR FLWD CNTR: CPT | Mod: TC

## 2023-05-30 PROCEDURE — 82565 ASSAY OF CREATININE: CPT | Performed by: UROLOGY

## 2023-05-30 PROCEDURE — 25500020 PHARM REV CODE 255: Performed by: UROLOGY

## 2023-05-30 RX ADMIN — IOHEXOL 100 ML: 350 INJECTION, SOLUTION INTRAVENOUS at 09:05

## 2023-06-13 NOTE — PROGRESS NOTES
CC:  Hematuria    HPI:  aDwson Guerra is a 62 y.o. male being seen in consultation for hematuria.  He was found to have microscopic hematuria.  He denies any gross hematuria.  He has no urinary symptoms.  He has a smoking history of 1/2 pack per day for 20 years.  A review of his previous urinalyses going back three years shows 3-5 RBC per high-powered field.    Urinalysis:  Results for orders placed or performed in visit on 05/11/23   POCT URINE DIPSTICK WITH MICROSCOPE, AUTOMATED   Result Value Ref Range    Color, UA Yellow     Spec Grav UA 1.025     pH, UA 7.0     WBC, UA trace     Nitrite, UA negative     Protein, POC negative     Glucose, UA negative     Ketones, UA negaitve     Urobilinogen, UA 0.2     Bilirubin, POC negative     Blood, UA trace-intact      Microscopic:   4-5 per HPF    Lab Results:  PSA History:     Latest Reference Range & Units 09/27/19 09:40 10/06/20 06:30 10/14/21 06:15 09/23/22 07:08   PSA, Screen <=4.00 ng/mL 0.7 0.5 0.68 0.77     Recent Labs     05/30/23  0810   CREATININE 0.84      Imaging:  Ultrasound - 19 December 2022:  There is an area in the upper pole of the right kidney which is  hypoechoic measuring 1.8 x 1.7 x 1.2    Data Review:  Note from referring provider, BAIRON Morton dated 27 March 2023.  Ultrasound.  Urinalyses..     ROS:  All systems reviewed and are negative except as documented in HPI and/or Assessment and Plan.     Patient Active Problem List:     Patient Active Problem List   Diagnosis    Liver cirrhosis secondary to ROCHA (nonalcoholic steatohepatitis)    Hypertension    Microscopic hematuria    Obesity    Tobacco user    Hypercalcemia    Polyp of ascending colon    Cecal polyp    Adenomatous polyp of descending colon    Adenomatous rectal polyp        Past Medical History:  Past Medical History:   Diagnosis Date    Elevated liver enzymes     Hypertension     Microhematuria     Positive FIT (fecal immunochemical test)     Prediabetes     Tobacco  user     Unspecified cirrhosis of liver         Past Surgical History:  Past Surgical History:   Procedure Laterality Date    COLONOSCOPY      COLONOSCOPY, WITH POLYPECTOMY USING SNARE N/A 5/18/2023    Procedure: COLONOSCOPY, WITH POLYPECTOMY USING SNARE;  Surgeon: Saravanan Andersen MD;  Location: [facility] ENDOSCOPY;  Service: Endoscopy;  Laterality: N/A;    ESOPHAGOGASTRODUODENOSCOPY      LIVER BIOPSY      POLYPECTOMY      TENDON REPAIR Right     foot        Family History:  Family History   Problem Relation Age of Onset    Lung cancer Mother     Stroke Father     Dementia Father     Liver cancer Brother         Social History:  Social History     Socioeconomic History    Marital status: Single   Tobacco Use    Smoking status: Every Day     Packs/day: 0.50     Years: 10.00     Pack years: 5.00     Types: Cigarettes    Smokeless tobacco: Current   Substance and Sexual Activity    Alcohol use: Not Currently    Drug use: Never    Sexual activity: Not Currently     Social Determinants of Health     Financial Resource Strain: Medium Risk    Difficulty of Paying Living Expenses: Somewhat hard   Food Insecurity: No Food Insecurity    Worried About Running Out of Food in the Last Year: Never true    Ran Out of Food in the Last Year: Never true   Transportation Needs: No Transportation Needs    Lack of Transportation (Medical): No    Lack of Transportation (Non-Medical): No   Physical Activity: Insufficiently Active    Days of Exercise per Week: 2 days    Minutes of Exercise per Session: 60 min   Stress: Stress Concern Present    Feeling of Stress : Rather much   Social Connections: Socially Isolated    Frequency of Communication with Friends and Family: Twice a week    Frequency of Social Gatherings with Friends and Family: Once a week    Attends Restorationist Services: Never    Active Member of Clubs or Organizations: No    Attends Club or Organization Meetings: Never    Marital Status: Never    Housing Stability: Low  Risk     Unable to Pay for Housing in the Last Year: No    Number of Places Lived in the Last Year: 1    Unstable Housing in the Last Year: No        Allergies:  Review of patient's allergies indicates:  No Known Allergies     Objective:  Vitals:    05/11/23 0808   BP: 134/82   Pulse: 73   Resp: 18   Temp: 97.5 °F (36.4 °C)     General:  Well developed, well nourished adult male in no acute distress  Abdomen: Soft, nontender, no masses  Extremities:  No clubbing, cyanosis, or edema  Neurologic:  Grossly intact  Musculoskeletal:  Normal tone    Assessment:  1. Asymptomatic microscopic hematuria  - Ambulatory referral/consult to Urology  - POCT URINE DIPSTICK WITH MICROSCOPE, AUTOMATED  - Cytology, Urine  - CT Abdomen Pelvis W Wo Contrast; Future  - Creatinine, serum; Future    2. Renal mass     Plan:  He needs a workup for microscopic hematuria with a CT scan, cystoscopy, and cytology was sent today.  The questionable area in the right kidney needs further evaluation with a CT scan.    Follow-up:  After CT for a cystoscopy.

## 2023-07-24 ENCOUNTER — PROCEDURE VISIT (OUTPATIENT)
Dept: UROLOGY | Facility: CLINIC | Age: 63
End: 2023-07-24
Payer: MEDICAID

## 2023-07-24 VITALS
SYSTOLIC BLOOD PRESSURE: 116 MMHG | TEMPERATURE: 98 F | BODY MASS INDEX: 31.05 KG/M2 | HEART RATE: 69 BPM | DIASTOLIC BLOOD PRESSURE: 76 MMHG | WEIGHT: 193.19 LBS | HEIGHT: 66 IN | RESPIRATION RATE: 18 BRPM | OXYGEN SATURATION: 100 %

## 2023-07-24 DIAGNOSIS — N28.89 RENAL MASS: ICD-10-CM

## 2023-07-24 DIAGNOSIS — R31.21 ASYMPTOMATIC MICROSCOPIC HEMATURIA: Primary | ICD-10-CM

## 2023-07-24 LAB
BILIRUB SERPL-MCNC: NEGATIVE MG/DL
BLOOD URINE, POC: NORMAL
COLOR, POC UA: NORMAL
GLUCOSE UR QL STRIP: NEGATIVE
KETONES UR QL STRIP: NEGATIVE
LEUKOCYTE ESTERASE URINE, POC: NORMAL
NITRITE, POC UA: NEGATIVE
PH, POC UA: 6
PROTEIN, POC: NEGATIVE
SPECIFIC GRAVITY, POC UA: 1.01
UROBILINOGEN, POC UA: 0.2

## 2023-07-24 PROCEDURE — 52000 CYSTOURETHROSCOPY: CPT | Mod: S$PBB,,, | Performed by: UROLOGY

## 2023-07-24 PROCEDURE — 52000 CYSTOURETHROSCOPY: CPT | Mod: PBBFAC | Performed by: UROLOGY

## 2023-07-24 PROCEDURE — 52000 PR CYSTOURETHROSCOPY: ICD-10-PCS | Mod: S$PBB,,, | Performed by: UROLOGY

## 2023-07-24 PROCEDURE — 81001 URINALYSIS AUTO W/SCOPE: CPT | Mod: PBBFAC | Performed by: UROLOGY

## 2023-07-24 RX ORDER — LIDOCAINE HYDROCHLORIDE 20 MG/ML
JELLY TOPICAL
Status: COMPLETED | OUTPATIENT
Start: 2023-07-24 | End: 2023-07-24

## 2023-07-24 RX ORDER — CIPROFLOXACIN 500 MG/1
500 TABLET ORAL
Status: COMPLETED | OUTPATIENT
Start: 2023-07-24 | End: 2023-07-24

## 2023-07-24 RX ADMIN — LIDOCAINE HYDROCHLORIDE: 20 JELLY TOPICAL at 10:07

## 2023-07-24 RX ADMIN — CIPROFLOXACIN 500 MG: 500 TABLET ORAL at 10:07

## 2023-07-24 NOTE — PROGRESS NOTES
Pt seen by Dr. Ordoñez; Pt consented & premedicated for procedure; Time out documentation completed; Cystoscopy performed w/no complications; Pt instructed to return to clinic in 6 months; Discharge paperwork given w/pt verbalizing understanding

## 2023-07-24 NOTE — PROCEDURES
CC:  Cystoscopy    HPI:  Dawson Guerra is a 63 y.o. male here for a cystoscopy for hematuria.  He was found to have microscopic hematuria.  Does have a positive smoking history.  He denies gross hematuria.  Had an ultrasound which showed a questionable mass in the upper pole of the right kidney.  He had a CT for today's visit.    Urinalysis:  Results for orders placed or performed in visit on 07/24/23   POCT URINE DIPSTICK WITH MICROSCOPE, AUTOMATED   Result Value Ref Range    Color, UA Light Yellow     Spec Grav UA 1.010     pH, UA 6.0     WBC, UA Small     Nitrite, UA Negative     Protein, POC Negative     Glucose, UA Negative     Ketones, UA Negative     Urobilinogen, UA 0.2     Bilirubin, POC Negative     Blood, UA Trace-intact      Microscopic:  3-4 WBC, 1-2 RBC per HPF    Imaging:  CT - 30 May 2023:    -  The kidneys are normal.      -  There is hepatic cirrhosis.    ROS:  All systems reviewed and are negative except as documented in HPI and/or Assessment and Plan.     Patient Active Problem List:     Patient Active Problem List   Diagnosis    Liver cirrhosis secondary to ROCHA (nonalcoholic steatohepatitis)    Hypertension    Microscopic hematuria    Obesity    Tobacco user    Hypercalcemia    Polyp of ascending colon    Cecal polyp    Adenomatous polyp of descending colon    Adenomatous rectal polyp        Past Medical History:  Past Medical History:   Diagnosis Date    Elevated liver enzymes     Hypertension     Microhematuria     Positive FIT (fecal immunochemical test)     Prediabetes     Tobacco user     Unspecified cirrhosis of liver         Past Surgical History:  Past Surgical History:   Procedure Laterality Date    COLONOSCOPY      COLONOSCOPY, WITH POLYPECTOMY USING SNARE N/A 5/18/2023    Procedure: COLONOSCOPY, WITH POLYPECTOMY USING SNARE;  Surgeon: Saravanan Andersen MD;  Location: [facility] ENDOSCOPY;  Service: Endoscopy;  Laterality: N/A;    ESOPHAGOGASTRODUODENOSCOPY      LIVER BIOPSY       POLYPECTOMY      TENDON REPAIR Right     foot        Family History:  Family History   Problem Relation Age of Onset    Lung cancer Mother     Stroke Father     Dementia Father     Liver cancer Brother         Social History:  Social History     Socioeconomic History    Marital status: Single   Tobacco Use    Smoking status: Former     Packs/day: 0.50     Years: 10.00     Pack years: 5.00     Types: Cigarettes    Smokeless tobacco: Current    Tobacco comments:     Quit 1 month ago   Substance and Sexual Activity    Alcohol use: Not Currently    Drug use: Never    Sexual activity: Not Currently     Social Determinants of Health     Financial Resource Strain: Medium Risk    Difficulty of Paying Living Expenses: Somewhat hard   Food Insecurity: No Food Insecurity    Worried About Running Out of Food in the Last Year: Never true    Ran Out of Food in the Last Year: Never true   Transportation Needs: No Transportation Needs    Lack of Transportation (Medical): No    Lack of Transportation (Non-Medical): No   Physical Activity: Insufficiently Active    Days of Exercise per Week: 2 days    Minutes of Exercise per Session: 60 min   Stress: Stress Concern Present    Feeling of Stress : Rather much   Social Connections: Socially Isolated    Frequency of Communication with Friends and Family: Twice a week    Frequency of Social Gatherings with Friends and Family: Once a week    Attends Mu-ism Services: Never    Active Member of Clubs or Organizations: No    Attends Club or Organization Meetings: Never    Marital Status: Never    Housing Stability: Low Risk     Unable to Pay for Housing in the Last Year: No    Number of Places Lived in the Last Year: 1    Unstable Housing in the Last Year: No        Allergies:  Review of patient's allergies indicates:  No Known Allergies     Objective:  Vitals:    07/24/23 0942   BP: 116/76   Pulse: 69   Resp: 18   Temp: 97.9 °F (36.6 °C)     General:  Well developed, well nourished  adult male in no acute distress  Abdomen: Soft, nontender, no masses  Extremities:  No clubbing, cyanosis, or edema  Neurologic:  Grossly intact  Musculoskeletal:  Normal tone    Cystoscopy:       - Penis:  Circumcised, no lesions        - Urethral meatus:  No strictures        - Urethra:  Normal without strictures or lesions           - Prostate:  Not enlarged        - Bladder neck:  Normal        - Bladder:  No mucosal abnormalities        - Ureteral orifices:  On the trigone with clear efflux bilaterally    The patient tolerated the procedure well without complications.  He was given Cipro 500mg, one tablet in the clinic.   The urethra was anesthetized with 2% Lidocaine Jelly, Urojet.      Assessment:  1. Asymptomatic microscopic hematuria  - POCT URINE DIPSTICK WITH MICROSCOPE, AUTOMATED    2. Renal mass     Plan:  The workup for microscopic hematuria is negative.  The renal mass seen on ultrasound is not visible on the CT scan.    Follow-up:  Six months for cytology.

## 2023-08-14 ENCOUNTER — OFFICE VISIT (OUTPATIENT)
Dept: GASTROENTEROLOGY | Facility: CLINIC | Age: 63
End: 2023-08-14
Payer: MEDICAID

## 2023-08-14 VITALS
BODY MASS INDEX: 31.18 KG/M2 | OXYGEN SATURATION: 97 % | RESPIRATION RATE: 16 BRPM | HEIGHT: 66 IN | TEMPERATURE: 98 F | WEIGHT: 194 LBS | SYSTOLIC BLOOD PRESSURE: 86 MMHG | DIASTOLIC BLOOD PRESSURE: 54 MMHG | HEART RATE: 73 BPM

## 2023-08-14 DIAGNOSIS — K70.30 ALCOHOLIC CIRRHOSIS OF LIVER WITHOUT ASCITES: Primary | ICD-10-CM

## 2023-08-14 PROCEDURE — 99214 OFFICE O/P EST MOD 30 MIN: CPT | Mod: PBBFAC | Performed by: STUDENT IN AN ORGANIZED HEALTH CARE EDUCATION/TRAINING PROGRAM

## 2023-08-14 NOTE — PROGRESS NOTES
Subjective     Patient ID: Dawson Guerra is a 63 y.o. male.    Chief Complaint: KING  (No complaints.)    63-year-old male with history of hypertension obesity, tobacco use and alcohol abuse presenting here to GI clinic for follow-up appointment for his cirrhosis.  Patient last seen by me November 22, 2021, at that time his cirrhotic liver disease has been well compensated, his MELD score  was 9, and his Child Neff class A. His last EGD performed June of 2021 and head revealed some small esophageal varices grade 1 in the mid and distal esophagus, nonbleeding.  His last abdominal ultrasound was February 2022 that revealed a cirrhotic appearing liver with no focal lesions present.  He has not had any hospitalizations or other ER visits for decompensation of his cirrhosis.  My recommendation at that time had been for him to have repeat EGD in 2023, and he has been avoiding alcohol since that time.       He initially was seen by our clinic after obtaining records from [facility] in January of 2020, his workup in the past has also included a liver biopsy in February of 2020 with findings of cirrhosis with mild steatosis.  The pathology did not elaborate on micro versus macrovesicular steatosis are common or need further findings other than cirrhosis.  The case has been discussed with staff here and was recommended to treat his alcohol plus/minus King cirrhosis.  Has undergone a colonoscopy in the past with Dr. Colunga November of 2019 with findings of 3 adenomatous polyps in the sigmoid colon and recommended recall of 3 years.  He does smoke 10 cigarettes daily and at that time was drinking alcohol on occasion.  He used to drink whiskey, rum and beer on the weekends for several years in the past.  Denied a family history of IBD, colon polyps or colon cancer.     5/23/22:  Denies any complaints today, denies any recent ER visits for decompensation of cirrhosis, denies any jaundice, abdominal swelling, lower  extremity swelling.  Only admits to easy bleeding with scratches or cuts of his upper extremities, I explained him likely result of thrombocytopenia from his cirrhotic liver disease.  He continues to avoid raw shellfish or NSAIDs.  Discussed his ultrasound result findings from February 2022, no focal lesions present.  He will be due for upcoming HCC screening with abdominal ultrasound August 2022.      12/19/22:  No complaints today, updated on HCC screening September this year, no masses or liver lesions present.  Will need to get updated MELD labs next month, and he is slated for EGD and colonoscopy May 2023.  Denies any melena, hematochezia, jaundice, lower extremity swelling no recent emergency room visits or hospitalizations for decompensated cirrhotic liver disease.      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  He denies any orthostatic like symptoms including feeling lightheaded or dizzy, or orthostatic when he stands up or sits up from lying down.  He states usually has to go through 1 or 2 shots per day while working.  He is also on HCTZ for blood pressure control.  Encouraged increase p.o. intake as well as hydration given her current heat wave in given his cirrhotic status can certainly lead to decompensation if he is volume depleted.  He is refrain from alcohol for nearly 36 months, and no longer smokes.  Having 2 bowel movements per day, nonbloody, no melena or hematochezia.  Current MELD is 7, Child Neff class A.  Most up-to-date imaging studies include a CT abdomen pelvis from May of this year which revealed baseline cirrhosis of the liver with no liver lesions noted.  Also up-to-date on colonoscopy performed this may, that revealed Six 2 to 5 mm  polyps in the rectum, in the  descending colon, in the ascending colon and in the cecum.  Recall in 3 years.    Review of Systems   Constitutional: Negative.    HENT: Negative.     Eyes: Negative.    Respiratory: Negative.     Cardiovascular: Negative.    Gastrointestinal: Negative.    Endocrine: Negative.    Genitourinary: Negative.    Musculoskeletal: Negative.    Integumentary:  Negative.   Allergic/Immunologic: Negative.    Neurological: Negative.    Hematological: Negative.    Psychiatric/Behavioral: Negative.          Objective   Vitals:    08/14/23 1505   BP: (!) 86/54   Pulse: 73   Resp: 16   Temp: 98.4 °F (36.9 °C)         Physical Exam  Constitutional:       Appearance: Normal appearance. He is obese.   HENT:      Head: Normocephalic and atraumatic.      Mouth/Throat:      Mouth: Mucous membranes are moist.      Pharynx: Oropharynx is clear.   Eyes:      Conjunctiva/sclera: Conjunctivae normal.      Pupils: Pupils are equal, round, and reactive to light.   Cardiovascular:      Rate and Rhythm: Normal rate and regular rhythm.      Pulses: Normal pulses.      Heart sounds: Normal heart sounds.   Pulmonary:      Effort: Pulmonary effort is normal.      Breath sounds: Normal breath sounds.   Abdominal:      General: Abdomen is flat. Bowel sounds are normal.      Palpations: Abdomen is soft.   Skin:     General: Skin is warm and dry.   Neurological:      General: No focal deficit present.      Mental Status: He is alert and oriented to person, place, and time. Mental status is at baseline.   Psychiatric:         Mood and Affect: Mood normal.         Thought Content: Thought content normal.         Judgment: Judgment normal.        Assessment and Plan     1. Alcoholic cirrhosis of liver without ascites  -     US Abdomen Limited_Liver; Future; Expected date: 11/14/2023        Background:  Alcohol: yes, in the past     Tobacco:  yes, in the past     Liver disease: ROCHA     MELD-Na: 7  Child-Neff Class: A      Transplant: not under evaluation, low MELD     Cirrhosis is decompensated with:     Ascites: no  Spontaneous bacterial peritonitis: No  Hepatic Encephalopathy: No  Hepatocellular carcinoma: No  Hepatorenal syndrome: No  Hyponatremia: No  Muscle wasting: No  Portal vein thrombosis: No     Screening:  Last EGD:  Hanna 15, 2021, revealed mild gastritis, small esophageal varices grade 1 in the mid and distal esophagus, nonbleeding.        Last imaging study:  CT abdomen and pelvis 5/30/23: cirrhosis of liver, no lesions present     CIRRHOSIS COUNSELING:  - strict abstinence of alcohol use  - low sodium (salt) 2000mg per day  - Nutrition: 25-30kcal (calorie per body weight in kilogram) per day  - No need to restrict protein in diet  - High protein diet: 1.2-1.5 gram/kg (protein per body weight in kg) per day to prevent muscle mass loss  - Resistance exercises for muscle strength  - Avoid raw seafoods due to risk of fatal Vibrio vulnificus infection  - Avoid Non-steroidal anti-inflammatory drugs (NSAIDs) such as ibuprofen, Motrin, naproxen, Aleve due to risk of kidney damage  - Can take acetaminophen (tylenol), no more than 2000mg per day  - Compliance with all medications  - Ultrasound or MRI of the liver every 6 months for liver cancer screening  - Upper endoscopy every 1-2 years to screen for varices      Six-month follow-up, he will need updated HCC screening November of this year.    Continue abstinence from alcohol.    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    MELD 7, Child Neff class a.    Will need updated EGD in 2024.    Will need updated c-scope in 2026.       Follow up in about 6 months (around 2/14/2024).

## 2023-08-16 LAB
INR PPP: 1
PROTHROMBIN TIME: 13.4 SECONDS (ref 11.4–14)

## 2023-10-12 DIAGNOSIS — I10 HYPERTENSION, UNSPECIFIED TYPE: ICD-10-CM

## 2023-10-13 RX ORDER — LISINOPRIL AND HYDROCHLOROTHIAZIDE 20; 25 MG/1; MG/1
1 TABLET ORAL DAILY
Qty: 30 TABLET | Refills: 0 | Status: SHIPPED | OUTPATIENT
Start: 2023-10-13 | End: 2023-11-09 | Stop reason: SDUPTHER

## 2023-11-08 ENCOUNTER — LAB VISIT (OUTPATIENT)
Dept: LAB | Facility: HOSPITAL | Age: 63
End: 2023-11-08
Attending: NURSE PRACTITIONER
Payer: MEDICAID

## 2023-11-08 DIAGNOSIS — R31.21 ASYMPTOMATIC MICROSCOPIC HEMATURIA: ICD-10-CM

## 2023-11-08 DIAGNOSIS — E83.52 HYPERCALCEMIA: ICD-10-CM

## 2023-11-08 LAB
CREAT SERPL-MCNC: 0.74 MG/DL (ref 0.73–1.18)
DEPRECATED CALCIDIOL+CALCIFEROL SERPL-MC: 39.9 NG/ML (ref 30–80)
GFR SERPLBLD CREATININE-BSD FMLA CKD-EPI: >60 MLS/MIN/1.73/M2

## 2023-11-08 PROCEDURE — 82306 VITAMIN D 25 HYDROXY: CPT

## 2023-11-08 PROCEDURE — 82565 ASSAY OF CREATININE: CPT

## 2023-11-08 PROCEDURE — 36415 COLL VENOUS BLD VENIPUNCTURE: CPT

## 2023-11-09 ENCOUNTER — OFFICE VISIT (OUTPATIENT)
Dept: INTERNAL MEDICINE | Facility: CLINIC | Age: 63
End: 2023-11-09
Payer: MEDICAID

## 2023-11-09 VITALS
WEIGHT: 197.38 LBS | RESPIRATION RATE: 20 BRPM | BODY MASS INDEX: 31.72 KG/M2 | DIASTOLIC BLOOD PRESSURE: 78 MMHG | SYSTOLIC BLOOD PRESSURE: 121 MMHG | HEART RATE: 77 BPM | HEIGHT: 66 IN | TEMPERATURE: 98 F

## 2023-11-09 DIAGNOSIS — Z12.5 SCREENING FOR PROSTATE CANCER: ICD-10-CM

## 2023-11-09 DIAGNOSIS — K74.60 LIVER CIRRHOSIS SECONDARY TO NASH (NONALCOHOLIC STEATOHEPATITIS): Primary | ICD-10-CM

## 2023-11-09 DIAGNOSIS — K75.81 LIVER CIRRHOSIS SECONDARY TO NASH (NONALCOHOLIC STEATOHEPATITIS): Primary | ICD-10-CM

## 2023-11-09 DIAGNOSIS — I10 HYPERTENSION, UNSPECIFIED TYPE: ICD-10-CM

## 2023-11-09 DIAGNOSIS — R31.29 MICROSCOPIC HEMATURIA: ICD-10-CM

## 2023-11-09 DIAGNOSIS — Z00.00 WELLNESS EXAMINATION: ICD-10-CM

## 2023-11-09 PROCEDURE — 3074F PR MOST RECENT SYSTOLIC BLOOD PRESSURE < 130 MM HG: ICD-10-PCS | Mod: CPTII,,, | Performed by: NURSE PRACTITIONER

## 2023-11-09 PROCEDURE — 3008F PR BODY MASS INDEX (BMI) DOCUMENTED: ICD-10-PCS | Mod: CPTII,,, | Performed by: NURSE PRACTITIONER

## 2023-11-09 PROCEDURE — 99214 OFFICE O/P EST MOD 30 MIN: CPT | Mod: S$PBB,,, | Performed by: NURSE PRACTITIONER

## 2023-11-09 PROCEDURE — 1160F RVW MEDS BY RX/DR IN RCRD: CPT | Mod: CPTII,,, | Performed by: NURSE PRACTITIONER

## 2023-11-09 PROCEDURE — 1160F PR REVIEW ALL MEDS BY PRESCRIBER/CLIN PHARMACIST DOCUMENTED: ICD-10-PCS | Mod: CPTII,,, | Performed by: NURSE PRACTITIONER

## 2023-11-09 PROCEDURE — 3008F BODY MASS INDEX DOCD: CPT | Mod: CPTII,,, | Performed by: NURSE PRACTITIONER

## 2023-11-09 PROCEDURE — 3078F DIAST BP <80 MM HG: CPT | Mod: CPTII,,, | Performed by: NURSE PRACTITIONER

## 2023-11-09 PROCEDURE — 3074F SYST BP LT 130 MM HG: CPT | Mod: CPTII,,, | Performed by: NURSE PRACTITIONER

## 2023-11-09 PROCEDURE — 4010F PR ACE/ARB THEARPY RXD/TAKEN: ICD-10-PCS | Mod: CPTII,,, | Performed by: NURSE PRACTITIONER

## 2023-11-09 PROCEDURE — 1159F MED LIST DOCD IN RCRD: CPT | Mod: CPTII,,, | Performed by: NURSE PRACTITIONER

## 2023-11-09 PROCEDURE — 4010F ACE/ARB THERAPY RXD/TAKEN: CPT | Mod: CPTII,,, | Performed by: NURSE PRACTITIONER

## 2023-11-09 PROCEDURE — 99214 OFFICE O/P EST MOD 30 MIN: CPT | Mod: PBBFAC | Performed by: NURSE PRACTITIONER

## 2023-11-09 PROCEDURE — 3078F PR MOST RECENT DIASTOLIC BLOOD PRESSURE < 80 MM HG: ICD-10-PCS | Mod: CPTII,,, | Performed by: NURSE PRACTITIONER

## 2023-11-09 PROCEDURE — 1159F PR MEDICATION LIST DOCUMENTED IN MEDICAL RECORD: ICD-10-PCS | Mod: CPTII,,, | Performed by: NURSE PRACTITIONER

## 2023-11-09 PROCEDURE — 99214 PR OFFICE/OUTPT VISIT, EST, LEVL IV, 30-39 MIN: ICD-10-PCS | Mod: S$PBB,,, | Performed by: NURSE PRACTITIONER

## 2023-11-09 RX ORDER — LISINOPRIL AND HYDROCHLOROTHIAZIDE 20; 25 MG/1; MG/1
1 TABLET ORAL DAILY
Qty: 90 TABLET | Refills: 1 | Status: SHIPPED | OUTPATIENT
Start: 2023-11-09 | End: 2024-05-07

## 2023-11-09 NOTE — PROGRESS NOTES
BAIRON Morton   [facility]  [address] [facility] [address]      PATIENT NAME: Dawson Guerra  : 1960  DATE: 23  MRN: [id number]      Billing Provider: BAIRON Morton  Level of Service:   Patient PCP Information       Provider PCP Type    BAIRON Morton General            Reason for Visit / Chief Complaint: Follow-up (Lab review) and Medication Refill       History of Present Illness / Problem Focused Workflow     Dawson Guerra presents to the clinic with Follow-up (Lab review) and Medication Refill           Initial Visit (18): 59 y/o  male here today to establish primary care. PMHx significant for HTN. Currently taking HCTZ-Lisinopril 25 mg/20 mg daily that was prescribed after pt presented to the ER on 18 d/t uncontrolled HTN. Bp was noted at 218/127. Today, Bp is at goal. Reports 100% compliance with medication. Daily tobacco user. Smoking ~ 1/2 PPD. Not ready to quit, however, he reports that this is significantly less than his 2 PPD hx. He reports occasional clear sputum production and nighttime wheezing. Denies fever, chills, night sweats, chest pain, SOB, HA, dizziness, weakness, abdominal pain, B/B incontinence, or any other concerns today.    18: 58 y.o. male presenting for f/u HTN, Tobacco Use. Bp at goal. HR slightly tachycardic. Reports smoking 1/2 cigarette in the parking lot prior to OV. Currently taking HCTZ-Lisinopril 25 mg-20 mg daily. Tolerating well. ~15 lb intentional weight loss. Pt states that after he was told his cholesterol level was elevated, he changed his diet. Continues to smoke same as above. Not ready to quit. CXR completed this am d/t former c/o wheezing. Denies wheeze or cough now. Denies CP or SOB. No other problems stated.    (19): Pt presenting for annual f/u. PmHx of HTN and Tobacco Use. He was contacted by GI in 2019 to schedule  "colonoscopy due to +FIT (10/2018), however, GI was unable to reach pt. FIT remains +. He states that he has been having "a lot going on, " and then admitted that he was nervous about having the scope. He does admit occasional bright red blood-tinged stools, but states "it doesn't happen all the time." He plans on visiting GI lab today to schedule.     Bp at goal. Taking HCTZ-Lisinopril 25 mg-20 mg daily.   Lab review:   HgA1c 6.1% (borderline high)   Mildly elevated AST. Pt admits to having an alcoholic beverage the night before. States that he drinks alcoholic beverages 2-3x/week   PSA 0.7   Slightly decreased total RBC count and platelet count   UA revealed MSH. MSH was present last year, but resolved on repeat assessment. Denies any LUTS.     Denies fever, chills, weakness, dizziness, night sweats, HA, chest pain, SOB, cough, wheeze, abd pain, dysuria, swelling, or any acute concerns.    Telephone Visit (2020): Mr. Osman is a 59 yo male with a PmHx of HTN, HLD (not taking any antihyperlipidemics per pt preference), cirrhosis of the liver (following GI), h/o ETOH abuse, colon polyps (3 adenomatous polyps in sigmoid colon , repeat 3 yrs), prediabetes, MSH, diverticulosis, left renal artery aneurysm (seen in Sx clinic 2019 w/ 1 yr f/u to monitor), and Tobacco Use, presenting for routine f/u via telephone due to COVID-19 pandemic. Patient has consented to telephone visit and was able to verify specific patient identification. I have verified that only myself and pt are on the telephone call and call is taking place in the [facility]. Pt last seen in clinic 2019. Has been following GI regularly for cirrhosis. Last visit 2020. He will f/u in 6 mths with labs. He was also referred for esophageal varices screening. He has been unable to complete labs as they  previously. Only taking HCTZ-Lisinopril 25 mg-20 mg daily. Reports tolerating well. Denies acute concerns.    Telephone " "Visit (10/7/2020): Mr. Osman is a 61 yo male with a PmHx of HTN, HLD (not taking any antihyperlipidemics per pt preference), cirrhosis of the liver (following GI), h/o ETOH abuse, colon polyps (3 adenomatous polyps in sigmoid colon 2019, repeat 3 yrs), prediabetes, MSH, diverticulosis, left renal artery aneurysm (seen in Sx clinic 11/12/2019 w/ 1 yr f/u to monitor), and Tobacco Use, presenting for routine f/u to review labs via telephone due to COVID-19 pandemic.  This is a telemedicine note. Patient was treated using telemedicine, real time audio, according to [facility] protocols. I conducted the visit from internal medicine office identified below. The patient participated in the visit at a non-[facility] location selected by the patient, identified below. I am licensed in the state where the patient stated they are located. The patient stated that they understood and accepted the privacy and security risks to their information at their location.   Patient was located at home  I was located at internal medicine office     Lab review:   PSA 0.5   HgA1c 5.2   CMP unremarkable   Trig 219   UA abnl, with MSH noted. Urine culture revealed no growth thus far.   Pt compliant w/ meds except for ASA because "somebody said it's not good to take aspirin every day!"   He will f/u in Vascular clinic next mth and is following GI.   No other problems stated.    (4/14/2021): Mr. Osman is a 61 yo male with a PmHx of HTN, HLD (not taking any antihyperlipidemics per pt preference), cirrhosis of the liver (following GI), h/o ETOH abuse, colon polyps (3 adenomatous polyps in sigmoid colon 2019, repeat 3 yrs), prediabetes, MSH, diverticulosis, left renal artery aneurysm, and Tobacco Use, presenting for f/u. Last visit with Vascular 1/2021 for left renal artery aneurysm. Aneurysm < 2 cm on imaging so no intervention at this time. He will f/u with them in 1 year. Labs performed and reviewed. ALT only minimally elevated above normal high. " "Pt will see GI in May 2021. EGD due 6/2021. He admits having a beer "every now and then." Trigs 200s. He was referred to Uro 10/2020 for MSH. No appt to date. CT A/P WWO 2019 did not reveal any findings to explain MSH. Denies easy bleeding, bruising, or gross hematuria. He reports compliance with Bp medication. Bp at goal today. No acute concerns.    Health Maintenance:   Colon Ca Screening-Colonoscopy revealed 3 adenomatous polyps in sigmoid colon 2019, repeat 3 yrs   Prostate Ca Screening-PSA 0.5    (10/14/2021): Mr. Osman is a 61 yo male with a PmHx of HTN, HLD (not taking any antihyperlipidemics per pt preference), cirrhosis of the liver (following GI), esophageal varices, h/o ETOH abuse, colon polyps (3 adenomatous polyps in sigmoid colon 2019, repeat 3 yrs), prediabetes, MSH, diverticulosis, left renal artery aneurysm, and Tobacco Use, presenting for f/u. S/p EGD with GI lab 6/16/21. Report and pathology results below. Pt will f/u with GI 11/22/2021. He is scheduled for surveillance of renal artery aneurysm in January. BP at goal. Pt is amenable to flu vaccine. He denies any acute concerns.   ED visit in July 2021 after "smashing" right index finger. He required stitches--which were removed. No complaints.   Lab review:  Chemistry panel unremarkable   HgA1c 4.9%   PSA 0.68   HDL 29, trig 311,    TSH WNL   Platelet count 127k   RBCs remain on UA    (4/1/2022): Mr. Osman is a 61 yo male with a PmHx of HTN, HLD (not taking any antihyperlipidemics per pt preference), cirrhosis of the liver (following GI), esophageal varices, h/o ETOH abuse, colon polyps (3 adenomatous polyps in sigmoid colon 2019, repeat 3 yrs), prediabetes, MSH, diverticulosis, left renal artery aneurysm, and Tobacco Use, presenting for f/u. Labs stable compared to personal baseline. Patient was scheduled in Vascular Clinic on 3/3/22 due to h/o renal artery aneurysm. He missed appt. He did complete renal artery US. He continues " "to follow GI clinic for cirrhosis. Scheduled to f/u 5/23/22. Reports has remained abstinent from ETOH use. At this time, he denies any acute s/s of hepatic decompensation. Pt compliant w/ meds except for ASA because "somebody said it's not good to take aspirin every day!" He denies any bleeding concerns. Previously referred to Uro for MSH. Last referral placed 4/2021. He states that he never received an appt. Denies gross hematuria. No acute concerns.    (9/26/2022): Mr. Osman is a 63 yo male with a PmHx of HTN, HLD (not taking any antihyperlipidemics per pt preference), cirrhosis of the liver (following GI), esophageal varices, h/o ETOH abuse, colon polyps (3 adenomatous polyps in sigmoid colon 2019, repeat 3 yrs), prediabetes, MSH, diverticulosis, left renal artery aneurysm, and Tobacco Use, presenting for f/u. Labs stable compared to personal baseline, aside from triglycerides 245. States had a piece of sausage prior to analysis. Patient was scheduled in Vascular Clinic on 5/3/22 due to h/o renal artery aneurysm. Noted stable on US. He will return in 2023 with annual US. He continues to follow GI clinic for cirrhosis. F/u 5/23/22. Reports has remained abstinent from ETOH use. At this time, he denies any acute s/s of hepatic decompensation and liver function tests are WNL. He defers vaccines. No other concerns today.    (3/27/2023): Mr. Osman is a 63 yo male with a PmHx of HTN, HLD (not taking any antihyperlipidemics per pt preference), cirrhosis of the liver (following GI), esophageal varices, h/o ETOH abuse, colon polyps (3 adenomatous polyps in sigmoid colon 2019, repeat 3 yrs), prediabetes, MSH, diverticulosis, left renal artery aneurysm (follows [facility]'s Vascular clinic), and Tobacco Use, presenting for f/u. Labs done earlier this month. Overall stable compared to personal baseline. Platelets were slightly decreased and calcium slightly elevated. Follows Vascular Clinic due to h/o renal artery " "aneurysm. States surveillance US scheduled this Friday. He continues to follow GI clinic for cirrhosis. Last visit 2022. He will undergo repeat colonoscopy and EGD May 2023. Reports has remained abstinent from ETOH use. At this time, he denies any acute s/s of hepatic decompensation such as edema, changes in LOC/mentation, SOB, N/V, abd complaints, and liver function tests are WNL. He defers vaccines. Needs refill on BP medication. No other concerns today.    Today's Visit (2023): Mr. Osman is a 62 yo male with HTN, HLD (not taking any antihyperlipidemics per pt preference), cirrhosis of the liver (following GI), esophageal varices, h/o ETOH abuse, colon polyps (3 adenomatous polyps in sigmoid colon , repeat 3 yrs), prediabetes, MSH, diverticulosis, left renal artery aneurysm (follows [facility] Vascular clinic), and Tobacco Use, presenting for f/u. Last seen 3/2023. He was to return in September but had to cancel appt due to vehicle issues so most labs . The only labs drawn included a Vitamin D level and creatinine/eGFR. Labs were WNL. Follows Vascular Clinic due to h/o renal artery aneurysm. He continues to follow GI clinic for cirrhosis. He has an abd US scheduled Monday. Reports has remained abstinent from ETOH use. At this time, he denies any acute s/s of hepatic decompensation such as edema, changes in LOC/mentation, SOB, N/V, abd complaints. S/p colonoscopy in May. See below. He also had an unremarkable cysto with Uro in July for MSH. He defers vaccines. Bp at goal today. Needs refill on BP medication. No other concerns today.    S/p colonoscopy 23  "Impression:            - Six 2 to 5 mm polyps in the rectum, in the                          descending colon, in the ascending colon and in                          the cecum.                          - A tattoo was seen in the sigmoid colon. A                          post-polypectomy scar was found at the tattoo                  " "        site. There was no evidence of residual polyp                          tissue.                          - The examination was otherwise normal.                          - No specimens collected.   Recommendation:        - Discharge patient to home (ambulatory).                          - Repeat colonoscopy in 3 years for surveillance. "  1. Cecal polyp, polypectomy:   - Adenomatous polyp.  - No evidence of malignancy or high grade dysplasia.      2. Proximal ascending colon polyp, polypectomy:   - Adenomatous polyp.  - No evidence of malignancy or high grade dysplasia.      3. Descending colon polyp, polypectomy:   - Adenomatous polyp.  - No evidence of malignancy or high grade dysplasia.      4. Rectal polyps x 2, polypectomy:   - Hyperplastic polyp(s).  - No evidence of malignancy or high grade dysplasia.           Review of Systems     Review of Systems   Constitutional: Negative.    HENT: Negative.     Eyes: Negative.    Respiratory: Negative.     Cardiovascular: Negative.    Gastrointestinal: Negative.  Negative for abdominal distention, abdominal pain, anal bleeding, blood in stool, constipation, diarrhea, nausea and vomiting.   Endocrine: Negative.    Genitourinary: Negative.    Musculoskeletal: Negative.    Skin: Negative.    Allergic/Immunologic: Negative.    Neurological: Negative.    Hematological: Negative.    Psychiatric/Behavioral: Negative.         Medical / Social / Family History     Past Medical History:   Diagnosis Date    Elevated liver enzymes     Hypertension     Microhematuria     Positive FIT (fecal immunochemical test)     Prediabetes     Tobacco user     Unspecified cirrhosis of liver        Past Surgical History:   Procedure Laterality Date    COLONOSCOPY      COLONOSCOPY, WITH POLYPECTOMY USING SNARE N/A 5/18/2023    Procedure: COLONOSCOPY, WITH POLYPECTOMY USING SNARE;  Surgeon: Saravanan Andersen MD;  Location: [facility] ENDOSCOPY;  Service: Endoscopy;  Laterality: N/A;    " ESOPHAGOGASTRODUODENOSCOPY      LIVER BIOPSY      POLYPECTOMY      TENDON REPAIR Right     foot       Social History    reports that he quit smoking about 2 months ago. His smoking use included cigarettes. He started smoking about 48 years ago. He has a 24.3 pack-year smoking history. He uses smokeless tobacco. He reports that he does not currently use alcohol. He reports that he does not use drugs.    Family History  's family history includes Dementia in his father; Liver cancer in his brother; Lung cancer in his mother; Stroke in his father.    Medications and Allergies     Medications  Medication List with Changes/Refills   Current Medications    ASPIRIN (ECOTRIN) 81 MG EC TABLET    Take 81 mg by mouth every other day.    MULTIVITAMIN WITH IRON TAB    Take 1 tablet by mouth once daily.    OMEGA-3 FATTY ACIDS/FISH OIL (FISH OIL-OMEGA-3 FATTY ACIDS) 300-1,000 MG CAPSULE    Take 1 capsule by mouth once daily.   Changed and/or Refilled Medications    Modified Medication Previous Medication    LISINOPRIL-HYDROCHLOROTHIAZIDE (PRINZIDE,ZESTORETIC) 20-25 MG TAB lisinopriL-hydrochlorothiazide (PRINZIDE,ZESTORETIC) 20-25 mg Tab       Take 1 tablet by mouth once daily.    Take 1 tablet by mouth once daily.       Allergies  Review of patient's allergies indicates:  No Known Allergies    Physical Examination     Vitals:    11/09/23 1212   BP: 121/78   Pulse: 77   Resp: 20   Temp: 97.9 °F (36.6 °C)     Physical Exam  Constitutional:       Appearance: Normal appearance. He is obese.   HENT:      Head: Normocephalic and atraumatic.   Eyes:      Extraocular Movements: Extraocular movements intact.      Conjunctiva/sclera: Conjunctivae normal.      Pupils: Pupils are equal, round, and reactive to light.   Neck:      Vascular: No carotid bruit.   Cardiovascular:      Rate and Rhythm: Normal rate and regular rhythm.      Pulses: Normal pulses.      Heart sounds: Normal heart sounds.   Pulmonary:      Effort: Pulmonary effort  is normal.      Breath sounds: Normal breath sounds.   Abdominal:      General: Bowel sounds are normal. There is no distension.      Palpations: Abdomen is soft.      Tenderness: There is no abdominal tenderness. There is no guarding.   Musculoskeletal:         General: Normal range of motion.      Cervical back: Normal range of motion.      Right lower leg: No edema.      Left lower leg: No edema.   Skin:     General: Skin is warm and dry.   Neurological:      General: No focal deficit present.      Mental Status: He is alert and oriented to person, place, and time.   Psychiatric:         Mood and Affect: Mood normal.         Behavior: Behavior normal.         Thought Content: Thought content normal.         Judgment: Judgment normal.           Results     Lab Results   Component Value Date    WBC 7.4 03/07/2023    RBC 4.91 03/07/2023    HGB 15.2 03/07/2023    HCT 43.8 03/07/2023    MCV 89.2 03/07/2023    MCH 31.0 03/07/2023    MCHC 34.7 03/07/2023    RDW 13.8 03/07/2023     (L) 03/07/2023    MPV 9.9 03/07/2023     CMP  Sodium Level   Date Value Ref Range Status   03/07/2023 137 136 - 145 mmol/L Final     Potassium Level   Date Value Ref Range Status   03/07/2023 3.9 3.5 - 5.1 mmol/L Final     Carbon Dioxide   Date Value Ref Range Status   03/07/2023 25 23 - 31 mmol/L Final     Blood Urea Nitrogen   Date Value Ref Range Status   03/07/2023 13.9 8.4 - 25.7 mg/dL Final     Creatinine   Date Value Ref Range Status   11/08/2023 0.74 0.73 - 1.18 mg/dL Final     Calcium Level Total   Date Value Ref Range Status   03/07/2023 10.7 (H) 8.8 - 10.0 mg/dL Final     Albumin Level   Date Value Ref Range Status   03/07/2023 3.9 3.4 - 4.8 g/dL Final     Bilirubin Total   Date Value Ref Range Status   03/07/2023 0.6 <=1.5 mg/dL Final     Alkaline Phosphatase   Date Value Ref Range Status   03/07/2023 48 40 - 150 unit/L Final     Aspartate Aminotransferase   Date Value Ref Range Status   03/07/2023 23 5 - 34 unit/L Final  "    Alanine Aminotransferase   Date Value Ref Range Status   03/07/2023 22 0 - 55 unit/L Final     Estimated GFR-Non    Date Value Ref Range Status   03/31/2022 >105 >=90      Lab Results   Component Value Date    CHOL 184 09/23/2022     Lab Results   Component Value Date    HDL 26 (L) 09/23/2022     No results found for: "LDLCALC"  Lab Results   Component Value Date    TRIG 245 (H) 09/23/2022     No results found for: "CHOLHDL"  Lab Results   Component Value Date    TSH 1.7876 09/23/2022     Lab Results   Component Value Date    PHUR 6.0 07/24/2023    SPECGRAV 1.010 07/24/2023    PROTEINUA Negative 03/07/2023    GLUCUA Negative 10/14/2021    KETONESU Negative 07/24/2023    OCCULTUA 0.03 (A) 10/14/2021    NITRITE Negative 07/24/2023    LEUKOCYTESUR 25 (A) 03/07/2023           Assessment and Plan (including Health Maintenance)     Plan:         Health Maintenance Due   Topic Date Due    RSV Vaccine (Age 60+) (1 - 1-dose 60+ series) Never done    COVID-19 Vaccine (4 - 2023-24 season) 09/01/2023       Problem List Items Addressed This Visit          Cardiac/Vascular    Hypertension    Overview     Lisinopril-HCTZ 20-25 mg po daily         Current Assessment & Plan     Bp at goal  Continue current medication  DASH         Relevant Medications    lisinopriL-hydrochlorothiazide (PRINZIDE,ZESTORETIC) 20-25 mg Tab       Renal/    Microscopic hematuria    Overview     3/7/23:   Final Diagnosis      Urine:  - Negative.  - No evidence of malignancy.        7/2023:  Cystoscopy:        - Penis:  Circumcised, no lesions        - Urethral meatus:  No strictures         - Urethra:  Normal without strictures or lesions           - Prostate:  Not enlarged         - Bladder neck:  Normal         - Bladder:  No mucosal abnormalities         - Ureteral orifices:  On the trigone with clear efflux bilaterally              Relevant Orders    Urinalysis, Reflex to Urine Culture       GI    Liver cirrhosis secondary to ROCHA " (nonalcoholic steatohepatitis) - Primary    Current Assessment & Plan     Keep routine f/u with Cirrhosis clinic. Scheduled 2/19/24  Continue complete ETOH abstinence         Relevant Orders    CBC Auto Differential    Comprehensive Metabolic Panel       Other    Wellness examination    Overview     Health Maintenance:  Colon Ca Screening-Colonoscopy revealed 3 adenomatous polyps in sigmoid colon 2019, repeat 3 yrs; 5/2023, colonoscopy. 6 benign polyps removed. Repeat 3 years  Prostate Ca Screening-PSA 0.77, 9/2022            Other Visit Diagnoses       Screening for prostate cancer        Relevant Orders    PSA, Screening            Added labs to be done 11/13/23 when patient comes in for [facility]. Will call with results  For any new or worsening symptoms that are urgent, or for any s/s of MI, CVA, or any other emergent concerns, please visit the ER for further eval. Otherwise, call clinic with questions or concerns.      Health Maintenance Topics with due status: Not Due       Topic Last Completion Date    TETANUS VACCINE 07/12/2021    Hemoglobin A1c (Diabetic Prevention Screening) 09/23/2022    Lipid Panel 09/23/2022    Colorectal Cancer Screening 05/18/2023       Future Appointments   Date Time Provider Department Center   11/13/2023  8:30 AM [facility] US3 [facility] US [place]   2/5/2024 11:00 AM Haleigh Ordoñez DO [facility] UROLO [place]   2/19/2024  2:00 PM Jeromy Olivia MD [facility] GASTRO [place]   5/9/2024  8:45 AM Lela Mendoza FNP [facility] INTMED [place]            Signature:  BAIRON Morton  [facility]  [address] [facility] [address]    Date of encounter: 11/9/23

## 2023-11-13 ENCOUNTER — HOSPITAL ENCOUNTER (OUTPATIENT)
Dept: RADIOLOGY | Facility: HOSPITAL | Age: 63
Discharge: HOME | End: 2023-11-13
Attending: STUDENT IN AN ORGANIZED HEALTH CARE EDUCATION/TRAINING PROGRAM
Payer: MEDICAID

## 2023-11-13 DIAGNOSIS — K70.30 ALCOHOLIC CIRRHOSIS OF LIVER WITHOUT ASCITES: ICD-10-CM

## 2023-11-13 PROCEDURE — 76705 ECHO EXAM OF ABDOMEN: CPT | Mod: TC

## 2024-02-12 PROBLEM — Z00.00 WELLNESS EXAMINATION: Status: RESOLVED | Noted: 2022-09-26 | Resolved: 2024-02-12

## 2024-02-19 ENCOUNTER — OFFICE VISIT (OUTPATIENT)
Dept: GASTROENTEROLOGY | Facility: CLINIC | Age: 64
End: 2024-02-19
Payer: MEDICAID

## 2024-02-19 VITALS
BODY MASS INDEX: 32.62 KG/M2 | OXYGEN SATURATION: 96 % | TEMPERATURE: 98 F | WEIGHT: 203 LBS | SYSTOLIC BLOOD PRESSURE: 118 MMHG | HEIGHT: 66 IN | HEART RATE: 82 BPM | DIASTOLIC BLOOD PRESSURE: 71 MMHG | RESPIRATION RATE: 16 BRPM

## 2024-02-19 DIAGNOSIS — K76.9 LIVER LESION: ICD-10-CM

## 2024-02-19 DIAGNOSIS — K75.81 NASH (NONALCOHOLIC STEATOHEPATITIS): Primary | ICD-10-CM

## 2024-02-19 LAB
ALBUMIN SERPL-MCNC: 3.7 G/DL (ref 3.4–4.8)
ALBUMIN/GLOB SERPL: 0.9 RATIO (ref 1.1–2)
ALP SERPL-CCNC: 38 UNIT/L (ref 40–150)
ALT SERPL-CCNC: 26 UNIT/L (ref 0–55)
AST SERPL-CCNC: 20 UNIT/L (ref 5–34)
BILIRUB SERPL-MCNC: 0.6 MG/DL
BUN SERPL-MCNC: 19.6 MG/DL (ref 8.4–25.7)
CALCIUM SERPL-MCNC: 9.1 MG/DL (ref 8.8–10)
CHLORIDE SERPL-SCNC: 104 MMOL/L (ref 98–107)
CO2 SERPL-SCNC: 27 MMOL/L (ref 23–31)
CREAT SERPL-MCNC: 0.81 MG/DL (ref 0.73–1.18)
GFR SERPLBLD CREATININE-BSD FMLA CKD-EPI: >60 MLS/MIN/1.73/M2
GLOBULIN SER-MCNC: 4.1 GM/DL (ref 2.4–3.5)
GLUCOSE SERPL-MCNC: 96 MG/DL (ref 82–115)
INR PPP: 1
POTASSIUM SERPL-SCNC: 3.4 MMOL/L (ref 3.5–5.1)
PROT SERPL-MCNC: 7.8 GM/DL (ref 5.8–7.6)
PROTHROMBIN TIME: 13.2 SECONDS (ref 11.4–14)
SODIUM SERPL-SCNC: 142 MMOL/L (ref 136–145)

## 2024-02-19 PROCEDURE — 99213 OFFICE O/P EST LOW 20 MIN: CPT | Mod: PBBFAC | Performed by: STUDENT IN AN ORGANIZED HEALTH CARE EDUCATION/TRAINING PROGRAM

## 2024-02-19 PROCEDURE — 36415 COLL VENOUS BLD VENIPUNCTURE: CPT | Performed by: STUDENT IN AN ORGANIZED HEALTH CARE EDUCATION/TRAINING PROGRAM

## 2024-02-19 PROCEDURE — 85610 PROTHROMBIN TIME: CPT | Performed by: STUDENT IN AN ORGANIZED HEALTH CARE EDUCATION/TRAINING PROGRAM

## 2024-02-19 PROCEDURE — 80053 COMPREHEN METABOLIC PANEL: CPT | Performed by: STUDENT IN AN ORGANIZED HEALTH CARE EDUCATION/TRAINING PROGRAM

## 2024-02-19 NOTE — PROGRESS NOTES
Subjective     Patient ID: Dawson Guerra is a 63 y.o. male.    Chief Complaint: Alcoholic Cirrhosis (No complaints)    63-year-old male with history of hypertension obesity, tobacco use and alcohol abuse presenting here to GI clinic for follow-up appointment for his cirrhosis.  Patient last seen by me November 22, 2021, at that time his cirrhotic liver disease has been well compensated, his MELD score  was 9, and his Child Neff class A. His last EGD performed June of 2021 and head revealed some small esophageal varices grade 1 in the mid and distal esophagus, nonbleeding.  His last abdominal ultrasound was February 2022 that revealed a cirrhotic appearing liver with no focal lesions present.  He has not had any hospitalizations or other ER visits for decompensation of his cirrhosis.  My recommendation at that time had been for him to have repeat EGD in 2023, and he has been avoiding alcohol since that time.       He initially was seen by our clinic after obtaining records from [facility] in January of 2020, his workup in the past has also included a liver biopsy in February of 2020 with findings of cirrhosis with mild steatosis.  The pathology did not elaborate on micro versus macrovesicular steatosis are common or need further findings other than cirrhosis.  The case has been discussed with staff here and was recommended to treat his alcohol plus/minus King cirrhosis.  Has undergone a colonoscopy in the past with Dr. Colunga November of 2019 with findings of 3 adenomatous polyps in the sigmoid colon and recommended recall of 3 years.  He does smoke 10 cigarettes daily and at that time was drinking alcohol on occasion.  He used to drink whiskey, rum and beer on the weekends for several years in the past.  Denied a family history of IBD, colon polyps or colon cancer.     5/23/22:  Denies any complaints today, denies any recent ER visits for decompensation of cirrhosis, denies any jaundice, abdominal swelling,  lower extremity swelling.  Only admits to easy bleeding with scratches or cuts of his upper extremities, I explained him likely result of thrombocytopenia from his cirrhotic liver disease.  He continues to avoid raw shellfish or NSAIDs.  Discussed his ultrasound result findings from February 2022, no focal lesions present.  He will be due for upcoming HCC screening with abdominal ultrasound August 2022.      12/19/22:  No complaints today, updated on HCC screening September this year, no masses or liver lesions present.  Will need to get updated MELD labs next month, and he is slated for EGD and colonoscopy May 2023.  Denies any melena, hematochezia, jaundice, lower extremity swelling no recent emergency room visits or hospitalizations for decompensated cirrhotic liver disease.       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  He denies any orthostatic like symptoms including feeling lightheaded or dizzy, or orthostatic when he stands up or sits up from lying down.  He states usually has to go through 1 or 2 shots per day while working.  He is also on HCTZ for blood pressure control.  Encouraged increase p.o. intake as well as hydration given her current heat wave in given his cirrhotic status can certainly lead to decompensation if he is volume depleted.  He is refrain from alcohol for nearly 36 months, and no longer smokes.  Having 2 bowel movements per day, nonbloody, no melena or hematochezia.  Current MELD is 7, Child Neff class A.  Most up-to-date imaging studies include a CT abdomen pelvis from May of this year which revealed baseline cirrhosis of the liver with no liver lesions noted.  Also up-to-date on colonoscopy performed this may, that revealed Six 2 to 5  mm polyps in the rectum, in the  descending colon, in the ascending colon and in the cecum.  Recall in 3 years.    Today's visit:  Patient with no complaints today, has been staying well hydrated since last appointment where he had been hypotensive due to decreased water intake.  Endorses 2 bowel movements per day, nonbloody, denies any hematemesis, jaundice.  Overdue on updated MELD labs, last done in March 2023, also needs updated endoscopy last performed June of 2021.  Endorses weight gain, has been eating processed foods and high quantities, high sodium.    Review of Systems   Constitutional: Negative.    HENT: Negative.     Eyes: Negative.    Respiratory: Negative.     Cardiovascular: Negative.    Gastrointestinal: Negative.    Endocrine: Negative.    Genitourinary: Negative.    Musculoskeletal: Negative.    Allergic/Immunologic: Negative.    Neurological: Negative.    Hematological: Negative.    Psychiatric/Behavioral: Negative.          Objective   Vitals:    02/19/24 1400   BP: 118/71   Pulse: 82   Resp: 16   Temp: 98.2 °F (36.8 °C)       Physical Exam  Constitutional:       Appearance: Normal appearance. He is obese.   HENT:      Head: Normocephalic and atraumatic.      Mouth/Throat:      Mouth: Mucous membranes are moist.      Pharynx: Oropharynx is clear.   Eyes:      Conjunctiva/sclera: Conjunctivae normal.      Pupils: Pupils are equal, round, and reactive to light.   Cardiovascular:      Rate and Rhythm: Normal rate and regular rhythm.      Pulses: Normal pulses.      Heart sounds: Normal heart sounds.   Pulmonary:      Effort: Pulmonary effort is normal.      Breath sounds: Normal breath sounds.   Abdominal:      General: Abdomen is flat. Bowel sounds are normal.   Musculoskeletal:         General: Normal range of motion.   Skin:     General: Skin is warm and dry.   Neurological:      General: No focal deficit present.      Mental Status: He is alert and oriented to person, place, and time. Mental  status is at baseline.        Assessment and Plan     1. ROCHA (nonalcoholic steatohepatitis)  -     Case Request Endoscopy: EGD (ESOPHAGOGASTRODUODENOSCOPY)        Background:  Alcohol: yes, in the past     Tobacco:  yes, in the past     Liver disease: ROCHA     MELD-Na: 7, needs to be updated today  Child-Neff Class: A     Transplant: not under evaluation, low MELD     Cirrhosis is decompensated with:     Ascites: no  Spontaneous bacterial peritonitis: No  Hepatic Encephalopathy: No  Hepatocellular carcinoma: No  Hepatorenal syndrome: No  Hyponatremia: No  Muscle wasting: No  Portal vein thrombosis: No     Screening:  Last EGD:  Hanna 15, 2021, revealed mild gastritis, small esophageal varices grade 1 in the mid and distal esophagus, nonbleeding.        Last imaging study:  Ultrasound abdomen 11/23/2023: Cirrhotic appearing liver, no hepatic lesions noted.     CIRRHOSIS COUNSELING:  - strict abstinence of alcohol use  - low sodium (salt) 2000mg per day  - Nutrition: 25-30kcal (calorie per body weight in kilogram) per day  - No need to restrict protein in diet  - High protein diet: 1.2-1.5 gram/kg (protein per body weight in kg) per day to prevent muscle mass loss  - Resistance exercises for muscle strength  - Avoid raw seafoods due to risk of fatal Vibrio vulnificus infection  - Avoid Non-steroidal anti-inflammatory drugs (NSAIDs) such as ibuprofen, Motrin, naproxen, Aleve due to risk of kidney damage  - Can take acetaminophen (tylenol), no more than 2000mg per day  - Compliance with all medications  - Ultrasound or MRI of the liver every 6 months for liver cancer screening  - Upper endoscopy every 1-2 years to screen for varices       Six-month follow-up, MELD labs to be drawn today, updated HCC screening to be performed May of 2024. will need updated endoscopy.    Follow up in about 6 months (around 8/19/2024).

## 2024-04-16 ENCOUNTER — OFFICE VISIT (OUTPATIENT)
Dept: INTERNAL MEDICINE | Facility: CLINIC | Age: 64
End: 2024-04-16
Payer: MEDICAID

## 2024-04-16 VITALS
WEIGHT: 202.38 LBS | BODY MASS INDEX: 32.53 KG/M2 | RESPIRATION RATE: 18 BRPM | TEMPERATURE: 98 F | SYSTOLIC BLOOD PRESSURE: 133 MMHG | DIASTOLIC BLOOD PRESSURE: 78 MMHG | HEIGHT: 66 IN | HEART RATE: 71 BPM | OXYGEN SATURATION: 99 %

## 2024-04-16 DIAGNOSIS — K75.81 LIVER CIRRHOSIS SECONDARY TO NASH (NONALCOHOLIC STEATOHEPATITIS): Primary | ICD-10-CM

## 2024-04-16 DIAGNOSIS — Z72.0 TOBACCO USER: ICD-10-CM

## 2024-04-16 DIAGNOSIS — Z00.00 WELL ADULT EXAM: ICD-10-CM

## 2024-04-16 DIAGNOSIS — I10 PRIMARY HYPERTENSION: ICD-10-CM

## 2024-04-16 DIAGNOSIS — Z12.5 PROSTATE CANCER SCREENING: ICD-10-CM

## 2024-04-16 DIAGNOSIS — D12.8 ADENOMATOUS RECTAL POLYP: ICD-10-CM

## 2024-04-16 DIAGNOSIS — I10 HYPERTENSION, UNSPECIFIED TYPE: ICD-10-CM

## 2024-04-16 DIAGNOSIS — R31.29 MICROSCOPIC HEMATURIA: ICD-10-CM

## 2024-04-16 DIAGNOSIS — K74.60 LIVER CIRRHOSIS SECONDARY TO NASH (NONALCOHOLIC STEATOHEPATITIS): Primary | ICD-10-CM

## 2024-04-16 PROCEDURE — 99214 OFFICE O/P EST MOD 30 MIN: CPT | Mod: S$PBB,,, | Performed by: NURSE PRACTITIONER

## 2024-04-16 PROCEDURE — 3078F DIAST BP <80 MM HG: CPT | Mod: CPTII,,, | Performed by: NURSE PRACTITIONER

## 2024-04-16 PROCEDURE — 99213 OFFICE O/P EST LOW 20 MIN: CPT | Mod: PBBFAC | Performed by: NURSE PRACTITIONER

## 2024-04-16 PROCEDURE — 3008F BODY MASS INDEX DOCD: CPT | Mod: CPTII,,, | Performed by: NURSE PRACTITIONER

## 2024-04-16 PROCEDURE — 1159F MED LIST DOCD IN RCRD: CPT | Mod: CPTII,,, | Performed by: NURSE PRACTITIONER

## 2024-04-16 PROCEDURE — 3075F SYST BP GE 130 - 139MM HG: CPT | Mod: CPTII,,, | Performed by: NURSE PRACTITIONER

## 2024-04-16 RX ORDER — LISINOPRIL AND HYDROCHLOROTHIAZIDE 20; 25 MG/1; MG/1
1 TABLET ORAL DAILY
Qty: 90 TABLET | Refills: 1 | Status: SHIPPED | OUTPATIENT
Start: 2024-04-16 | End: 2024-10-13

## 2024-04-16 NOTE — PROGRESS NOTES
Patient ID: [id number]     Chief Complaint: Establish Care        HPI:     HPI      Dawson Guerra is a 63 y.o. male here today to establish care. Pt previously followed by Lela Mendoza NP. Pt followed in Dr Olivia Cirrhosis clinic. Pt has hx HTN, Nonalcholic steatohepatitis, Hx of colon polyp, Left renal artery aneurysm- follows vascular, Microscopic hematuria.         Immunizations:   Immunization History   Administered Date(s) Administered    COVID-19, MRNA, LN-S, PF (Pfizer) (Purple Cap) 03/17/2021, 04/07/2021, 11/09/2021    Influenza - Quadrivalent 10/14/2021    Influenza - Quadrivalent - PF *Preferred* (6 months and older) 01/31/2018, 10/14/2021    Influenza - Trivalent - PF (ADULT) 01/31/2018    Tdap 07/12/2021        -------------------------------------    Elevated liver enzymes    Hypertension    Microhematuria    Positive FIT (fecal immunochemical test)    Prediabetes    Tobacco user    Unspecified cirrhosis of liver        Past Surgical History:   Procedure Laterality Date    COLONOSCOPY      COLONOSCOPY, WITH POLYPECTOMY USING SNARE N/A 5/18/2023    Procedure: COLONOSCOPY, WITH POLYPECTOMY USING SNARE;  Surgeon: Saravanan Andersen MD;  Location: [facility] ENDOSCOPY;  Service: Endoscopy;  Laterality: N/A;    ESOPHAGOGASTRODUODENOSCOPY      LIVER BIOPSY      POLYPECTOMY      TENDON REPAIR Right     foot       Review of patient's allergies indicates:  No Known Allergies    Current Outpatient Medications   Medication Instructions    ascorbic acid (vitamin C) (VITAMIN C) 100 mg, Oral, Daily    aspirin (ECOTRIN) 81 mg, Every other day    lisinopriL-hydrochlorothiazide (PRINZIDE,ZESTORETIC) 20-25 mg Tab 1 tablet, Oral, Daily    multivitamin with iron Tab 1 tablet, Oral, Daily    omega-3 fatty acids/fish oil (FISH OIL-OMEGA-3 FATTY ACIDS) 300-1,000 mg capsule 1 capsule, Oral, Daily       Social History     Socioeconomic History    Marital status: Single   Tobacco Use    Smoking status: Former     Current  packs/day: 0.00     Average packs/day: 0.5 packs/day for 48.7 years (24.3 ttl pk-yrs)     Types: Cigarettes     Start date:      Quit date: 2023     Years since quittin.6    Smokeless tobacco: Current    Tobacco comments:     Quit 1 month ago   Substance and Sexual Activity    Alcohol use: Not Currently    Drug use: Never    Sexual activity: Not Currently     Social Determinants of Health     Financial Resource Strain: Medium Risk (2022)    Overall Financial Resource Strain (CARDIA)     Difficulty of Paying Living Expenses: Somewhat hard   Food Insecurity: No Food Insecurity (2022)    Hunger Vital Sign     Worried About Running Out of Food in the Last Year: Never true     Ran Out of Food in the Last Year: Never true   Transportation Needs: No Transportation Needs (2022)    PRAPARE - Transportation     Lack of Transportation (Medical): No     Lack of Transportation (Non-Medical): No   Physical Activity: Insufficiently Active (2022)    Exercise Vital Sign     Days of Exercise per Week: 2 days     Minutes of Exercise per Session: 60 min   Stress: Stress Concern Present (2022)    [employer] [place] [employer] - Occupational Stress Questionnaire     Feeling of Stress : Rather much   Social Connections: Socially Isolated (2022)    Social Connection and Isolation Panel [NHANES]     Frequency of Communication with Friends and Family: Twice a week     Frequency of Social Gatherings with Friends and Family: Once a week     Attends Yazidism Services: Never     Active Member of Clubs or Organizations: No     Attends Club or Organization Meetings: Never     Marital Status: Never    Housing Stability: Low Risk  (2022)    Housing Stability Vital Sign     Unable to Pay for Housing in the Last Year: No     Number of Places Lived in the Last Year: 1     Unstable Housing in the Last Year: No        Family History   Problem Relation Name Age of Onset    Lung cancer  "Mother      Stroke Father      Dementia Father      Liver cancer Brother          Patient Care Team:  Sanjuanita Colunga FNP as PCP - General (Family Medicine)     Subjective:     Review of Systems     See HPI for details    Constitutional: Denies Change in appetite. Denies Chills. Denies Fever. Denies Night sweats.  Eye: Denies Blurred vision. Denies Discharge. Denies Eye pain.  ENT: Denies Decreased hearing. Denies Sore throat. Denies Swollen glands.  Respiratory: Denies Cough. Denies Shortness of breath. Denies Shortness of breath with exertion. Denies Wheezing.  Cardiovascular: DeniesChest pain at rest. Denies Chest pain with exertion. Denies Irregular heartbeat. Denies Palpitations. Denies Edema.  Gastrointestinal: Denies Abdominal pain. DeniesDiarrhea. Denies Nausea. Denies Vomiting. Denies Hematemesis or Hematochezia.  Genitourinary: Denies Dysuria. Denies Urinary frequency. Denies Urinary urgency. Denies Blood in urine.  Endocrine: Denies Cold intolerance. Denies Excessive thirst. Denies Heat intolerance. Denies Weight loss. Denies Weight gain.  Musculoskeletal: Denies Painful joints. Denies Weakness.  Integumentary: Denies Rash. Denies Itching. Denies Dry skin.  Neurologic: Denies Dizziness. Denies Fainting. Denies Headache.  Psychiatric: Denies Depression. Denies Anxiety. Denies Suicidal/Homicidal ideations.    All Other ROS: Negative except as stated in HPI.       Objective:     Visit Vitals  /78 (BP Location: Left arm, Patient Position: Sitting, BP Method: Medium (Automatic))   Pulse 71   Temp 97.8 °F (36.6 °C) (Oral)   Resp 18   Ht 5' 6" (1.676 m)   Wt 91.8 kg (202 lb 6.4 oz)   SpO2 99%   BMI 32.67 kg/m²       Physical Exam    General: Alert and oriented, No acute distress.  Head: Normocephalic, Atraumatic.  Eye: Pupils are equal, round and reactive to light, Extraocular movements are intact, Sclera non-icteric.  Ears/Nose/Throat: Normal, Mucosa moist,Clear.  Neck/Thyroid: Supple, Non-tender, No " carotid bruit, No lymphadenopathy, No JVD, Full range of motion.  Respiratory: Clear to auscultation bilaterally; No wheezes, rales or rhonchi,Non-labored respirations, Symmetrical chest wall expansion.  Cardiovascular: Regular rate and rhythm, S1/S2 normal, No murmurs, rubs or gallops.  Gastrointestinal: Soft, Non-tender, Non-distended, Normal bowel sounds, No palpable organomegaly.  Musculoskeletal: Normal range of motion.  Integumentary: Warm, Dry, Intact, No suspicious lesions or rashes.  Extremities: No clubbing, cyanosis or edema  Neurologic: No focal deficits, Cranial Nerves II-XII are grossly intact, Motor strength normal upper and lower extremities, Sensory exam intact.  Psychiatric: Normal interaction, Coherent speech, Euthymic mood, Appropriate affect       Labs Reviewed:     Chemistry:  Lab Results   Component Value Date     02/19/2024    K 3.4 (L) 02/19/2024    CHLORIDE 104 02/19/2024    BUN 19.6 02/19/2024    CREATININE 0.81 02/19/2024    EGFRNORACEVR >60 02/19/2024    GLUCOSE 96 02/19/2024    CALCIUM 9.1 02/19/2024    ALKPHOS 38 (L) 02/19/2024    LABPROT 7.8 (H) 02/19/2024    ALBUMIN 3.7 02/19/2024    BILIDIR 0.3 03/31/2022    IBILI 0.40 03/31/2022    AST 20 02/19/2024    ALT 26 02/19/2024    VDPAQKBX41LZ 39.9 11/08/2023        Lab Results   Component Value Date    HGBA1C 4.5 09/23/2022        Hematology:  Lab Results   Component Value Date    WBC 7.4 03/07/2023    HGB 15.2 03/07/2023    HCT 43.8 03/07/2023     (L) 03/07/2023       Lipid Panel:  Lab Results   Component Value Date    CHOL 184 09/23/2022    HDL 26 (L) 09/23/2022    .00 09/23/2022    TRIG 245 (H) 09/23/2022    TOTALCHOLEST 7 (H) 09/23/2022        Urine:  Lab Results   Component Value Date    COLORUA Yellow 03/07/2023    APPEARANCEUA Clear 03/07/2023    SGUA 1.021 03/07/2023    PHUA 7.0 03/07/2023    PROTEINUA Negative 03/07/2023    GLUCOSEUA Normal 03/07/2023    KETONESUA Negative 03/07/2023    BLOODUA Trace (A)  03/07/2023    NITRITESUA Negative 03/07/2023    LEUKOCYTESUR 25 (A) 03/07/2023    RBCUA 0-5 03/07/2023    WBCUA 0-5 03/07/2023    BACTERIA None Seen 03/07/2023    SQEPUA Trace (A) 03/07/2023    HYALINECASTS None Seen 03/07/2023        Assessment:       ICD-10-CM ICD-9-CM   1. Liver cirrhosis secondary to ROCHA (nonalcoholic steatohepatitis)  K75.81 571.8    K74.60 571.5   2. Primary hypertension  I10 401.9   3. Microscopic hematuria  R31.29 599.72   4. Tobacco user  Z72.0 305.1   5. Adenomatous rectal polyp  D12.8 211.4   6. Well adult exam  Z00.00 V70.0   7. Prostate cancer screening  Z12.5 V76.44        Plan:     1. Liver cirrhosis secondary to ROCHA (nonalcoholic steatohepatitis)  Pt followed by Dr Olivia in Cirrhosis clinic. Keep appt. Encouraged low fat diet and exercise, avoid Tylenol and ETOH.     2. Primary hypertension  BP controlled. Low fat low salt diet and exercise. Refilled Lisinopril HCTZ as prescribed.     3. Microscopic hematuria  Pt asymptomatic. UA in 1 month.     4. Tobacco user  Pt states he quit smoking in Aug 2023. Encouraged continued smoking cessation.     5. Adenomatous rectal polyp  Pt last colonoscopy done 5-18-23 with 6 polyps- suggest repeat in 3 years due 5-2026.     6. Well adult exam  Labs in 1 month. PSA in 1 month. Pt last colonoscopy done 5-18-23 with 6 polyps- no malignancy- suggest repeat in 3 years due 5-2026.     7. Prostate cancer screening  PSA in 1 month.     8. History of tobacco abuse  Pt states he quit smoking 8/2023. Encouraged continued smoking cessation. Offered LDCT- pt refused.       Follow up in about 1 month (around 5/16/2024) for with labs 1 week prior to appt. . In addition to their scheduled follow up, the patient has also been instructed to follow up on as needed basis.     Future Appointments   Date Time Provider Department Center   5/17/2024  7:30 AM [address] [place]    8/19/2024  1:30 PM Jeromy Olivia MD [facility] [place]         Sanjuanita PLATA  BAIRON Colunga

## 2024-05-14 ENCOUNTER — LAB VISIT (OUTPATIENT)
Dept: LAB | Facility: HOSPITAL | Age: 64
End: 2024-05-14
Attending: NURSE PRACTITIONER
Payer: MEDICAID

## 2024-05-14 DIAGNOSIS — I10 PRIMARY HYPERTENSION: ICD-10-CM

## 2024-05-14 DIAGNOSIS — Z12.5 PROSTATE CANCER SCREENING: ICD-10-CM

## 2024-05-14 LAB
ANION GAP SERPL CALC-SCNC: 8 MEQ/L
BASOPHILS # BLD AUTO: 0.04 X10(3)/MCL
BASOPHILS NFR BLD AUTO: 0.6 %
BUN SERPL-MCNC: 17 MG/DL (ref 8.4–25.7)
CALCIUM SERPL-MCNC: 9.6 MG/DL (ref 8.8–10)
CHLORIDE SERPL-SCNC: 104 MMOL/L (ref 98–107)
CHOLEST SERPL-MCNC: 166 MG/DL
CHOLEST/HDLC SERPL: 6 {RATIO} (ref 0–5)
CO2 SERPL-SCNC: 27 MMOL/L (ref 23–31)
CREAT SERPL-MCNC: 0.79 MG/DL (ref 0.73–1.18)
CREAT/UREA NIT SERPL: 22
EOSINOPHIL # BLD AUTO: 0.18 X10(3)/MCL (ref 0–0.9)
EOSINOPHIL NFR BLD AUTO: 2.6 %
ERYTHROCYTE [DISTWIDTH] IN BLOOD BY AUTOMATED COUNT: 14.4 % (ref 11.5–17)
EST. AVERAGE GLUCOSE BLD GHB EST-MCNC: 79.6 MG/DL
GFR SERPLBLD CREATININE-BSD FMLA CKD-EPI: >60 ML/MIN/1.73/M2
GLUCOSE SERPL-MCNC: 96 MG/DL (ref 82–115)
HBA1C MFR BLD: 4.4 %
HCT VFR BLD AUTO: 41.4 % (ref 42–52)
HDLC SERPL-MCNC: 28 MG/DL (ref 35–60)
HGB BLD-MCNC: 14.6 G/DL (ref 14–18)
IMM GRANULOCYTES # BLD AUTO: 0.09 X10(3)/MCL (ref 0–0.04)
IMM GRANULOCYTES NFR BLD AUTO: 1.3 %
LDLC SERPL CALC-MCNC: 98 MG/DL (ref 50–140)
LYMPHOCYTES # BLD AUTO: 2.01 X10(3)/MCL (ref 0.6–4.6)
LYMPHOCYTES NFR BLD AUTO: 28.5 %
MCH RBC QN AUTO: 32.2 PG (ref 27–31)
MCHC RBC AUTO-ENTMCNC: 35.3 G/DL (ref 33–36)
MCV RBC AUTO: 91.2 FL (ref 80–94)
MONOCYTES # BLD AUTO: 0.7 X10(3)/MCL (ref 0.1–1.3)
MONOCYTES NFR BLD AUTO: 9.9 %
NEUTROPHILS # BLD AUTO: 4.03 X10(3)/MCL (ref 2.1–9.2)
NEUTROPHILS NFR BLD AUTO: 57.1 %
NRBC BLD AUTO-RTO: 0 %
PLATELET # BLD AUTO: 122 X10(3)/MCL (ref 130–400)
PLATELETS.RETICULATED NFR BLD AUTO: 2.3 % (ref 0.9–11.2)
PMV BLD AUTO: 9.6 FL (ref 7.4–10.4)
POTASSIUM SERPL-SCNC: 3.8 MMOL/L (ref 3.5–5.1)
PSA SERPL-MCNC: 0.66 NG/ML
RBC # BLD AUTO: 4.54 X10(6)/MCL (ref 4.7–6.1)
SODIUM SERPL-SCNC: 139 MMOL/L (ref 136–145)
TRIGL SERPL-MCNC: 198 MG/DL (ref 34–140)
TSH SERPL-ACNC: 1.69 UIU/ML (ref 0.35–4.94)
VLDLC SERPL CALC-MCNC: 40 MG/DL
WBC # SPEC AUTO: 7.05 X10(3)/MCL (ref 4.5–11.5)

## 2024-05-14 PROCEDURE — 85025 COMPLETE CBC W/AUTO DIFF WBC: CPT

## 2024-05-14 PROCEDURE — 80061 LIPID PANEL: CPT

## 2024-05-14 PROCEDURE — 36415 COLL VENOUS BLD VENIPUNCTURE: CPT

## 2024-05-14 PROCEDURE — 83036 HEMOGLOBIN GLYCOSYLATED A1C: CPT

## 2024-05-14 PROCEDURE — 80048 BASIC METABOLIC PNL TOTAL CA: CPT

## 2024-05-14 PROCEDURE — 84153 ASSAY OF PSA TOTAL: CPT

## 2024-05-14 PROCEDURE — 84443 ASSAY THYROID STIM HORMONE: CPT

## 2024-05-16 ENCOUNTER — OFFICE VISIT (OUTPATIENT)
Dept: INTERNAL MEDICINE | Facility: CLINIC | Age: 64
End: 2024-05-16
Payer: MEDICAID

## 2024-05-16 VITALS
TEMPERATURE: 98 F | WEIGHT: 199 LBS | DIASTOLIC BLOOD PRESSURE: 74 MMHG | HEIGHT: 66 IN | HEART RATE: 89 BPM | SYSTOLIC BLOOD PRESSURE: 118 MMHG | BODY MASS INDEX: 31.98 KG/M2 | RESPIRATION RATE: 18 BRPM

## 2024-05-16 DIAGNOSIS — Z12.5 PROSTATE CANCER SCREENING: ICD-10-CM

## 2024-05-16 DIAGNOSIS — Z72.0 TOBACCO USER: ICD-10-CM

## 2024-05-16 DIAGNOSIS — R31.21 ASYMPTOMATIC MICROSCOPIC HEMATURIA: ICD-10-CM

## 2024-05-16 DIAGNOSIS — D12.8 ADENOMATOUS RECTAL POLYP: ICD-10-CM

## 2024-05-16 DIAGNOSIS — K75.81 LIVER CIRRHOSIS SECONDARY TO NASH (NONALCOHOLIC STEATOHEPATITIS): Primary | ICD-10-CM

## 2024-05-16 DIAGNOSIS — Z00.00 WELL ADULT EXAM: ICD-10-CM

## 2024-05-16 DIAGNOSIS — K74.60 LIVER CIRRHOSIS SECONDARY TO NASH (NONALCOHOLIC STEATOHEPATITIS): Primary | ICD-10-CM

## 2024-05-16 DIAGNOSIS — E78.1 HYPERTRIGLYCERIDEMIA: ICD-10-CM

## 2024-05-16 DIAGNOSIS — I10 PRIMARY HYPERTENSION: ICD-10-CM

## 2024-05-16 PROBLEM — R31.9 HEMATURIA: Status: ACTIVE | Noted: 2024-05-16

## 2024-05-16 PROCEDURE — 3008F BODY MASS INDEX DOCD: CPT | Mod: CPTII,,, | Performed by: NURSE PRACTITIONER

## 2024-05-16 PROCEDURE — 3074F SYST BP LT 130 MM HG: CPT | Mod: CPTII,,, | Performed by: NURSE PRACTITIONER

## 2024-05-16 PROCEDURE — 3078F DIAST BP <80 MM HG: CPT | Mod: CPTII,,, | Performed by: NURSE PRACTITIONER

## 2024-05-16 PROCEDURE — 99214 OFFICE O/P EST MOD 30 MIN: CPT | Mod: PBBFAC | Performed by: NURSE PRACTITIONER

## 2024-05-16 PROCEDURE — 99214 OFFICE O/P EST MOD 30 MIN: CPT | Mod: S$PBB,,, | Performed by: NURSE PRACTITIONER

## 2024-05-16 PROCEDURE — 3044F HG A1C LEVEL LT 7.0%: CPT | Mod: CPTII,,, | Performed by: NURSE PRACTITIONER

## 2024-05-16 PROCEDURE — 4010F ACE/ARB THERAPY RXD/TAKEN: CPT | Mod: CPTII,,, | Performed by: NURSE PRACTITIONER

## 2024-05-16 NOTE — PROGRESS NOTES
Patient ID: [id number]     Chief Complaint: LAB RESULTS        HPI:     HPI      Dawson Guerra is a 63 y.o. male here today for a follow up.         Immunizations:   Immunization History   Administered Date(s) Administered    COVID-19, MRNA, LN-S, PF (Pfizer) (Purple Cap) 2021, 2021, 2021    Influenza - Quadrivalent 10/14/2021    Influenza - Quadrivalent - PF *Preferred* (6 months and older) 2018, 10/14/2021    Influenza - Trivalent - PF (ADULT) 2018    Tdap 2021        -------------------------------------    Elevated liver enzymes    Hypertension    Microhematuria    Positive FIT (fecal immunochemical test)    Prediabetes    Tobacco user    Unspecified cirrhosis of liver        Past Surgical History:   Procedure Laterality Date    COLONOSCOPY      COLONOSCOPY, WITH POLYPECTOMY USING SNARE N/A 2023    Procedure: COLONOSCOPY, WITH POLYPECTOMY USING SNARE;  Surgeon: Saravanan Andersen MD;  Location: [facility] ENDOSCOPY;  Service: Endoscopy;  Laterality: N/A;    ESOPHAGOGASTRODUODENOSCOPY      LIVER BIOPSY      POLYPECTOMY      TENDON REPAIR Right     foot       Review of patient's allergies indicates:  No Known Allergies    Current Outpatient Medications   Medication Instructions    ascorbic acid (vitamin C) (VITAMIN C) 100 mg, Oral, Daily    aspirin (ECOTRIN) 81 mg, Every other day    lisinopriL-hydrochlorothiazide (PRINZIDE,ZESTORETIC) 20-25 mg Tab 1 tablet, Oral, Daily    multivitamin with iron Tab 1 tablet, Oral, Daily    omega-3 fatty acids/fish oil (FISH OIL-OMEGA-3 FATTY ACIDS) 300-1,000 mg capsule 1 capsule, Oral, Daily       Social History     Socioeconomic History    Marital status: Single   Tobacco Use    Smoking status: Former     Current packs/day: 0.00     Average packs/day: 0.5 packs/day for 48.7 years (24.3 ttl pk-yrs)     Types: Cigarettes     Start date:      Quit date: 2023     Years since quittin.7    Smokeless tobacco: Current    Tobacco  comments:     Quit 1 month ago   Substance and Sexual Activity    Alcohol use: Not Currently    Drug use: Never    Sexual activity: Not Currently     Social Determinants of Health     Financial Resource Strain: Medium Risk (9/26/2022)    Overall Financial Resource Strain (CARDIA)     Difficulty of Paying Living Expenses: Somewhat hard   Food Insecurity: No Food Insecurity (9/26/2022)    Hunger Vital Sign     Worried About Running Out of Food in the Last Year: Never true     Ran Out of Food in the Last Year: Never true   Transportation Needs: No Transportation Needs (9/26/2022)    PRAPARE - Transportation     Lack of Transportation (Medical): No     Lack of Transportation (Non-Medical): No   Physical Activity: Insufficiently Active (9/26/2022)    Exercise Vital Sign     Days of Exercise per Week: 2 days     Minutes of Exercise per Session: 60 min   Stress: Stress Concern Present (9/26/2022)    [employer] [place] [employer] - Occupational Stress Questionnaire     Feeling of Stress : Rather much   Housing Stability: Low Risk  (9/26/2022)    Housing Stability Vital Sign     Unable to Pay for Housing in the Last Year: No     Number of Places Lived in the Last Year: 1     Unstable Housing in the Last Year: No        Family History   Problem Relation Name Age of Onset    Lung cancer Mother      Stroke Father      Dementia Father      Liver cancer Brother          Patient Care Team:  Sanjuanita Colunga FNP as PCP - General (Family Medicine)     Subjective:     Review of Systems     See HPI for details    Constitutional: Denies Change in appetite. Denies Chills. Denies Fever. Denies Night sweats.  Eye: Denies Blurred vision. Denies Discharge. Denies Eye pain.  ENT: Denies Decreased hearing. Denies Sore throat. Denies Swollen glands.  Respiratory: Denies Cough. Denies Shortness of breath. Denies Shortness of breath with exertion. Denies Wheezing.  Cardiovascular: DeniesChest pain at rest. Denies Chest pain with  "exertion. Denies Irregular heartbeat. Denies Palpitations. Denies Edema.  Gastrointestinal: Denies Abdominal pain. DeniesDiarrhea. Denies Nausea. Denies Vomiting. Denies Hematemesis or Hematochezia.  Genitourinary: Denies Dysuria. Denies Urinary frequency. Denies Urinary urgency. Denies Blood in urine.  Endocrine: Denies Cold intolerance. Denies Excessive thirst. Denies Heat intolerance. Denies Weight loss. Denies Weight gain.  Musculoskeletal: Denies Painful joints. Denies Weakness.  Integumentary: Denies Rash. Denies Itching. Denies Dry skin.  Neurologic: Denies Dizziness. Denies Fainting. Denies Headache.  Psychiatric: Denies Depression. Denies Anxiety. Denies Suicidal/Homicidal ideations.    All Other ROS: Negative except as stated in HPI.       Objective:     Visit Vitals  /74 (BP Location: Right arm, Patient Position: Sitting, BP Method: Large (Automatic))   Pulse 89   Temp 98.2 °F (36.8 °C) (Oral)   Resp 18   Ht 5' 6" (1.676 m)   Wt 90.3 kg (199 lb)   BMI 32.12 kg/m²       Physical Exam    General: Alert and oriented, No acute distress.  Head: Normocephalic, Atraumatic.  Eye: Pupils are equal, round and reactive to light, Extraocular movements are intact, Sclera non-icteric.  Ears/Nose/Throat: Normal, Mucosa moist,Clear.  Neck/Thyroid: Supple, Non-tender, No carotid bruit, No lymphadenopathy, No JVD, Full range of motion.  Respiratory: Clear to auscultation bilaterally; No wheezes, rales or rhonchi,Non-labored respirations, Symmetrical chest wall expansion.  Cardiovascular: Regular rate and rhythm, S1/S2 normal, No murmurs, rubs or gallops.  Gastrointestinal: Soft, Non-tender, Non-distended, Normal bowel sounds, No palpable organomegaly.  Musculoskeletal: Normal range of motion.  Integumentary: Warm, Dry, Intact, No suspicious lesions or rashes.  Extremities: No clubbing, cyanosis or edema  Neurologic: No focal deficits, Cranial Nerves II-XII are grossly intact, Motor strength normal upper and lower " extremities, Sensory exam intact.  Psychiatric: Normal interaction, Coherent speech, Euthymic mood, Appropriate affect       Labs Reviewed:     Chemistry:  Lab Results   Component Value Date     05/14/2024    K 3.8 05/14/2024    CHLORIDE 104 05/14/2024    BUN 17.0 05/14/2024    CREATININE 0.79 05/14/2024    EGFRNORACEVR >60 05/14/2024    GLUCOSE 96 05/14/2024    CALCIUM 9.6 05/14/2024    ALKPHOS 38 (L) 02/19/2024    LABPROT 7.8 (H) 02/19/2024    ALBUMIN 3.7 02/19/2024    BILIDIR 0.3 03/31/2022    IBILI 0.40 03/31/2022    AST 20 02/19/2024    ALT 26 02/19/2024    TVTWBIJF64RP 39.9 11/08/2023        Lab Results   Component Value Date    HGBA1C 4.4 05/14/2024        Hematology:  Lab Results   Component Value Date    WBC 7.05 05/14/2024    HGB 14.6 05/14/2024    HCT 41.4 (L) 05/14/2024     (L) 05/14/2024       Lipid Panel:  Lab Results   Component Value Date    CHOL 166 05/14/2024    HDL 28 (L) 05/14/2024    LDL 98.00 05/14/2024    TRIG 198 (H) 05/14/2024    TOTALCHOLEST 6 (H) 05/14/2024        Urine:  Lab Results   Component Value Date    COLORUA Light-Yellow 05/14/2024    APPEARANCEUA Clear 05/14/2024    SGUA 1.021 05/14/2024    PHUA 7.0 05/14/2024    PROTEINUA Negative 05/14/2024    GLUCOSEUA Normal 05/14/2024    KETONESUA Negative 05/14/2024    BLOODUA Trace (A) 05/14/2024    NITRITESUA Negative 05/14/2024    LEUKOCYTESUR 75 (A) 05/14/2024    RBCUA 0-5 05/14/2024    WBCUA 0-5 05/14/2024    BACTERIA None Seen 05/14/2024    SQEPUA None Seen 05/14/2024    HYALINECASTS None Seen 05/14/2024        Assessment:       ICD-10-CM ICD-9-CM   1. Liver cirrhosis secondary to ROCHA (nonalcoholic steatohepatitis)  K75.81 571.8    K74.60 571.5   2. Primary hypertension  I10 401.9   3. Asymptomatic microscopic hematuria  R31.21 599.72   4. Tobacco user  Z72.0 305.1   5. Adenomatous rectal polyp  D12.8 211.4   6. Well adult exam  Z00.00 V70.0   7. Prostate cancer screening  Z12.5 V76.44   8. Hypertriglyceridemia  E78.1  272.1        Plan:     1. Liver cirrhosis secondary to ROCHA (nonalcoholic steatohepatitis)  Pt followed in GI- Dr Olivia Cirrhosis cl. Keep appts.     2. Primary hypertension  BP controlled. Low fat low salt diet and exercise. Cont Lisinopril HCTZ as prescribed.     3. Asymptomatic microscopic hematuria  Pt asymptomatic. Will repeat UA in 3 months.     4. Tobacco user  Offered LDCT screening. Pt refused. Pt states he quit smoking 9 months ago. Encouraged continued cessation.     5. Adenomatous rectal polyp  Pt last colonoscopy done 5-18-23 with 6 polyps- suggest repeat in 3 years due 5-2026.     6. Well adult exam  Labs in 3 months. UTD PSA. Pt last colonoscopy done 5-18-23 with 6 polyps- no malignancy- suggest repeat in 3 years due 5-2026.     7. Prostate cancer screening  UTD PSA.     8. Hypertriglyceridemia  Trigs 198 down from 245. Statin therapy contraindicated with Elevated liver enzymes. Encouraged strict low fat diet and exercise.          Follow up in about 3 months (around 8/16/2024) for with labs 1 week prior to appt. . In addition to their scheduled follow up, the patient has also been instructed to follow up on as needed basis.     Future Appointments   Date Time Provider Department Center   5/17/2024  7:30 AM [address] [place]    8/19/2024  1:30 PM Jeromy Olivia MD [facility] GASTRO [place]        BAIRON Jacobs

## 2024-05-17 ENCOUNTER — HOSPITAL ENCOUNTER (OUTPATIENT)
Dept: RADIOLOGY | Facility: HOSPITAL | Age: 64
Discharge: HOME | End: 2024-05-17
Attending: STUDENT IN AN ORGANIZED HEALTH CARE EDUCATION/TRAINING PROGRAM
Payer: MEDICAID

## 2024-05-17 DIAGNOSIS — K75.81 NASH (NONALCOHOLIC STEATOHEPATITIS): ICD-10-CM

## 2024-05-17 PROCEDURE — 76705 ECHO EXAM OF ABDOMEN: CPT | Mod: TC

## 2024-05-21 ENCOUNTER — TELEPHONE (OUTPATIENT)
Dept: GASTROENTEROLOGY | Facility: CLINIC | Age: 64
End: 2024-05-21
Payer: MEDICAID

## 2024-05-21 NOTE — TELEPHONE ENCOUNTER
----- Message from Shyla Olvera sent at 5/21/2024  1:03 PM CDT -----  Regarding: return call  Pt states that he had voicemail from Dr Olivia about ultrasound results.  Pt # [phone]

## 2024-05-24 ENCOUNTER — ANESTHESIA EVENT (OUTPATIENT)
Dept: ENDOSCOPY | Facility: HOSPITAL | Age: 64
End: 2024-05-24
Payer: MEDICAID

## 2024-05-24 RX ORDER — LIDOCAINE HYDROCHLORIDE 10 MG/ML
1 INJECTION, SOLUTION EPIDURAL; INFILTRATION; INTRACAUDAL; PERINEURAL ONCE
Status: CANCELLED | OUTPATIENT
Start: 2024-05-24 | End: 2024-05-24

## 2024-05-24 NOTE — ANESTHESIA PREPROCEDURE EVALUATION
"                                                                                                             05/24/2024  Dawson Guerra is a 63 y.o., male with PMHx of obesity, HTN, HLD, smoking, decaying denition;  ROCHA presents for screening EGD.    Vitals:    05/20/24 1255 06/03/24 1045   BP:  111/72   BP Location:  Left arm   Patient Position:  Lying   Pulse:  80   Resp:  16   Temp:  36.9 °C (98.4 °F)   TempSrc:  Oral   SpO2:  99%   Weight: 88.5 kg (195 lb) 88.6 kg (195 lb 6.4 oz)   Height: 5' 6" (1.676 m) 5' 6" (1.676 m)         NO BETA BLOCKER USE    Active Ambulatory Problems     Diagnosis Date Noted    Liver cirrhosis secondary to ROCHA (nonalcoholic steatohepatitis) 05/23/2022    Hypertension 09/26/2022    Microscopic hematuria 09/26/2022    Obesity 09/26/2022    Tobacco user 09/26/2022    Hypercalcemia 03/27/2023    Polyp of ascending colon 05/18/2023    Cecal polyp 05/18/2023    Adenomatous polyp of descending colon 05/18/2023    Adenomatous rectal polyp 05/18/2023    Well adult exam 04/16/2024    Prostate cancer screening 04/16/2024    Hematuria 05/16/2024    Hypertriglyceridemia 05/16/2024     Resolved Ambulatory Problems     Diagnosis Date Noted    Wellness examination 09/26/2022     Past Medical History:   Diagnosis Date    Elevated liver enzymes     Microhematuria     Positive FIT (fecal immunochemical test)     Prediabetes     Unspecified cirrhosis of liver        Pre-op Assessment    I have reviewed the Patient Summary Reports.     I have reviewed the Nursing Notes. I have reviewed the NPO Status.   I have reviewed the Medications.     Review of Systems  Anesthesia Hx:  No problems with previous Anesthesia   History of prior surgery of interest to airway management or planning:          Denies Family Hx of Anesthesia complications.    Denies Personal Hx of Anesthesia complications.                    Hematology/Oncology:  Hematology Normal   Oncology Normal                                 "   EENT/Dental:  EENT/Dental Normal           Cardiovascular:  Cardiovascular Normal                                            Pulmonary:  Pulmonary Normal                       Renal/:  Renal/ Normal                 Hepatic/GI:  Hepatic/GI Normal                 Musculoskeletal:  Musculoskeletal Normal                Neurological:  Neurology Normal                                      Endocrine:  Endocrine Normal            Dermatological:  Skin Normal    Psych:  Psychiatric Normal                    Physical Exam  General: Cooperative    Airway:  Mallampati: II / III/ II  Mouth Opening: Normal  TM Distance: Normal  Tongue: Normal  Neck ROM: Normal ROM      Lab Results   Component Value Date    WBC 7.05 05/14/2024    HGB 14.6 05/14/2024    HCT 41.4 (L) 05/14/2024    MCV 91.2 05/14/2024     (L) 05/14/2024       CMP  Sodium   Date Value Ref Range Status   05/14/2024 139 136 - 145 mmol/L Final     Potassium   Date Value Ref Range Status   05/14/2024 3.8 3.5 - 5.1 mmol/L Final     CO2   Date Value Ref Range Status   05/14/2024 27 23 - 31 mmol/L Final     Blood Urea Nitrogen   Date Value Ref Range Status   05/14/2024 17.0 8.4 - 25.7 mg/dL Final     Creatinine   Date Value Ref Range Status   05/14/2024 0.79 0.73 - 1.18 mg/dL Final     Calcium   Date Value Ref Range Status   05/14/2024 9.6 8.8 - 10.0 mg/dL Final     Albumin   Date Value Ref Range Status   02/19/2024 3.7 3.4 - 4.8 g/dL Final     Bilirubin Total   Date Value Ref Range Status   02/19/2024 0.6 <=1.5 mg/dL Final     ALP   Date Value Ref Range Status   02/19/2024 38 (L) 40 - 150 unit/L Final     AST   Date Value Ref Range Status   02/19/2024 20 5 - 34 unit/L Final     ALT   Date Value Ref Range Status   02/19/2024 26 0 - 55 unit/L Final     eGFR   Date Value Ref Range Status   05/14/2024 >60 mL/min/1.73/m2 Final     Lab Results   Component Value Date    INR 1.0 02/19/2024    INR 1.0 03/07/2023    INR 1.06 11/22/2021    PROTIME 13.2 02/19/2024     PROTIME 13.4 03/07/2023    PROTIME 13.7 11/22/2021           Anesthesia Plan  Type of Anesthesia, risks & benefits discussed:    Anesthesia Type: Gen Natural Airway  Intra-op Monitoring Plan: Standard ASA Monitors  Post Op Pain Control Plan: IV/PO Opioids PRN  (medical reason for not using multimodal pain management)  Induction:  IV  Informed Consent: Informed consent signed with the Patient and all parties understand the risks and agree with anesthesia plan.  All questions answered. Patient consented to blood products? No  ASA Score: 3  Day of Surgery Review of History & Physical: H&P Update referred to the surgeon/provider.    Ready For Surgery From Anesthesia Perspective.     .

## 2024-06-03 ENCOUNTER — ANESTHESIA (OUTPATIENT)
Dept: ENDOSCOPY | Facility: HOSPITAL | Age: 64
End: 2024-06-03
Payer: MEDICAID

## 2024-06-03 ENCOUNTER — HOSPITAL ENCOUNTER (OUTPATIENT)
Facility: HOSPITAL | Age: 64
Discharge: HOME | End: 2024-06-03
Attending: INTERNAL MEDICINE | Admitting: INTERNAL MEDICINE
Payer: MEDICAID

## 2024-06-03 DIAGNOSIS — K74.60 LIVER CIRRHOSIS SECONDARY TO NASH (NONALCOHOLIC STEATOHEPATITIS): Primary | ICD-10-CM

## 2024-06-03 DIAGNOSIS — K75.81 LIVER CIRRHOSIS SECONDARY TO NASH (NONALCOHOLIC STEATOHEPATITIS): Primary | ICD-10-CM

## 2024-06-03 PROCEDURE — 63600175 PHARM REV CODE 636 W HCPCS: Performed by: NURSE ANESTHETIST, CERTIFIED REGISTERED

## 2024-06-03 PROCEDURE — 94640 AIRWAY INHALATION TREATMENT: CPT

## 2024-06-03 PROCEDURE — 43235 EGD DIAGNOSTIC BRUSH WASH: CPT | Performed by: INTERNAL MEDICINE

## 2024-06-03 PROCEDURE — 25000242 PHARM REV CODE 250 ALT 637 W/ HCPCS: Performed by: SPECIALIST

## 2024-06-03 PROCEDURE — 63600175 PHARM REV CODE 636 W HCPCS: Performed by: ANESTHESIOLOGY

## 2024-06-03 PROCEDURE — 25000003 PHARM REV CODE 250: Performed by: NURSE ANESTHETIST, CERTIFIED REGISTERED

## 2024-06-03 PROCEDURE — D9220A PRA ANESTHESIA: Mod: ,,, | Performed by: NURSE ANESTHETIST, CERTIFIED REGISTERED

## 2024-06-03 PROCEDURE — 37000008 HC ANESTHESIA 1ST 15 MINUTES: Performed by: INTERNAL MEDICINE

## 2024-06-03 RX ORDER — PROPOFOL 10 MG/ML
INJECTION, EMULSION INTRAVENOUS
Status: DISCONTINUED | OUTPATIENT
Start: 2024-06-03 | End: 2024-06-03

## 2024-06-03 RX ORDER — IPRATROPIUM BROMIDE AND ALBUTEROL SULFATE 2.5; .5 MG/3ML; MG/3ML
3 SOLUTION RESPIRATORY (INHALATION) ONCE
Status: COMPLETED | OUTPATIENT
Start: 2024-06-03 | End: 2024-06-03

## 2024-06-03 RX ORDER — LIDOCAINE HYDROCHLORIDE 20 MG/ML
INJECTION INTRAVENOUS
Status: DISCONTINUED | OUTPATIENT
Start: 2024-06-03 | End: 2024-06-03

## 2024-06-03 RX ORDER — SODIUM CHLORIDE, SODIUM LACTATE, POTASSIUM CHLORIDE, CALCIUM CHLORIDE 600; 310; 30; 20 MG/100ML; MG/100ML; MG/100ML; MG/100ML
INJECTION, SOLUTION INTRAVENOUS CONTINUOUS
Status: DISCONTINUED | OUTPATIENT
Start: 2024-06-03 | End: 2024-06-03 | Stop reason: HOSPADM

## 2024-06-03 RX ADMIN — SODIUM CHLORIDE, POTASSIUM CHLORIDE, SODIUM LACTATE AND CALCIUM CHLORIDE: 600; 310; 30; 20 INJECTION, SOLUTION INTRAVENOUS at 11:06

## 2024-06-03 RX ADMIN — IPRATROPIUM BROMIDE AND ALBUTEROL SULFATE 3 ML: .5; 3 SOLUTION RESPIRATORY (INHALATION) at 10:06

## 2024-06-03 RX ADMIN — PROPOFOL 100 MG: 10 INJECTION, EMULSION INTRAVENOUS at 12:06

## 2024-06-03 RX ADMIN — LIDOCAINE HYDROCHLORIDE 50 MG: 20 INJECTION INTRAVENOUS at 12:06

## 2024-06-03 NOTE — PROVATION PATIENT INSTRUCTIONS
Discharge Summary/Instructions after an Endoscopic Procedure  Patient Name: Dawson Guerra  Patient MRN: [id number]  Patient YOB: 1960  Monday, Hanna 3, 2024  Rocio Jacob MD  Dear patient,  As a result of recent federal legislation (The Federal Cures Act), you may   receive lab or pathology results from your procedure in your [facility]   account before your physician is able to contact you. Your physician or   their representative will relay the results to you with their   recommendations at their soonest availability.  Thank you,  RESTRICTIONS:  During your procedure today, you received medications for sedation.  These   medications may affect your judgment, balance and coordination.  Therefore,   for 24 hours, you have the following restrictions:   - DO NOT drive a car, operate machinery, make legal/financial decisions,   sign important papers or drink alcohol.    ACTIVITY:  Today: no heavy lifting, straining or running due to procedural   sedation/anesthesia.  The following day: return to full activity including work.  DIET:  Eat and drink normally unless instructed otherwise.     TREATMENT FOR COMMON SIDE EFFECTS:  - Mild abdominal pain, nausea, belching, bloating or excessive gas:  rest,   eat lightly and use a heating pad.  - Sore Throat: treat with throat lozenges and/or gargle with warm salt   water.  - Because air was used during the procedure, expelling large amounts of air   from your rectum or belching is normal.  - If a bowel prep was taken, you may not have a bowel movement for 1-3 days.    This is normal.  SYMPTOMS TO WATCH FOR AND REPORT TO YOUR PHYSICIAN:  1. Abdominal pain or bloating, other than gas cramps.  2. Chest pain.  3. Back pain.  4. Signs of infection such as: chills or fever occurring within 24 hours   after the procedure.  5. Rectal bleeding, which would show as bright red, maroon, or black stools.   (A tablespoon of blood from the rectum is not serious,  especially if   hemorrhoids are present.)  6. Vomiting.  7. Weakness or dizziness.  GO DIRECTLY TO THE NEAREST EMERGENCY ROOM IF YOU HAVE ANY OF THE FOLLOWING:      Difficulty breathing              Chills and/or fever over 101 F   Persistent vomiting and/or vomiting blood   Severe abdominal pain   Severe chest pain   Black, tarry stools   Bleeding- more than one tablespoon   Any other symptom or condition that you feel may need urgent attention  Your doctor recommends these additional instructions:  If any biopsies were taken, your doctors clinic will contact you in 1 to 2   weeks with any results.  - Patient has a contact number available for emergencies.  The signs and   symptoms of potential delayed complications were discussed with the   patient.  Return to normal activities tomorrow.  Written discharge   instructions were provided to the patient.   - Discharge patient to home.   - Resume previous diet.   - Continue present medications.   - Repeat upper endoscopy in 2 years for surveillance.   - Return to GI clinic as previously scheduled.  For questions, problems or results please call your physician - Rocio Jacob MD at Work:  [phone], Work:  [phone].  [facility] , EMERGENCY ROOM PHONE NUMBER: [phone]  IF A COMPLICATION OR EMERGENCY SITUATION ARISES AND YOU ARE UNABLE TO REACH   YOUR PHYSICIAN - GO DIRECTLY TO THE EMERGENCY ROOM.  Rocio Jacob MD  6/3/2024 1:03:33 PM  This report has been verified and signed electronically.  Dear patient,  As a result of recent federal legislation (The Federal Cures Act), you may   receive lab or pathology results from your procedure in your [facility]   account before your physician is able to contact you. Your physician or   their representative will relay the results to you with their   recommendations at their soonest availability.  Thank you,  PROVATION

## 2024-06-03 NOTE — ANESTHESIA POSTPROCEDURE EVALUATION
Anesthesia Post Evaluation    Patient: Dawson Guerra    Procedure(s) Performed: Procedure(s) (LRB):  EGD (ESOPHAGOGASTRODUODENOSCOPY) (N/A)    Final Anesthesia Type: general      Patient location during evaluation: GI PACU  Patient participation: Yes- Able to Participate  Level of consciousness: awake and alert  Post-procedure vital signs: reviewed and stable  Pain management: adequate  Airway patency: patent    PONV status at discharge: No PONV  Anesthetic complications: no      Cardiovascular status: blood pressure returned to baseline  Respiratory status: unassisted  Hydration status: euvolemic  Follow-up not needed.              Vitals Value Taken Time   /72 06/03/24 1045   Temp 36.9 °C (98.4 °F) 06/03/24 1045   Pulse 80 06/03/24 1045   Resp 16 06/03/24 1045   SpO2 99 % 06/03/24 1045         No case tracking events are documented in the log.      Pain/Nalini Score: No data recorded

## 2024-06-03 NOTE — H&P
EGD History and Physical    Patient Name: Dawson Guerra  MRN: [id number]  : 1960  Date of Procedure:  6/3/2024  Referring Physician: Jeromy Olivia MD  Primary Physician: Sanjuanita Colunga, FNP  Procedure Physician: Rocio Jacob MD, MPH     Procedure - EGD  ASA - per anesthesia  Mallampati - per anesthesia  History of Anesthesia problems - no  Family history Anesthesia problems -  no   Plan of anesthesia - General    Diagnosis: F/u esophageal varices  Chief Complaint: Same as above    HPI:  62 y/o M with a Hx of HTN, tobacco use, alcohol use disorder with MELD 9 alcoholic cirrhosis with a previous diagnosis of esophageal varices presenting today to undergo f/u EGD for surveillance. He has been in his usual state of health, denies abdominal complaints at this time. No hematemesis, hematochezia/melena    Last EGD: 24  Last colonoscopy: 23  Family history: Non-contributory  Anticoagulation: None    ROS:  Negative, otherwise stated in HPI    Medical History:   Past Medical History:   Diagnosis Date    Elevated liver enzymes     Hypertension     Microhematuria     Positive FIT (fecal immunochemical test)     Prediabetes     Tobacco user     Unspecified cirrhosis of liver          Surgical History:   Past Surgical History:   Procedure Laterality Date    COLONOSCOPY      COLONOSCOPY, WITH POLYPECTOMY USING SNARE N/A 2023    Procedure: COLONOSCOPY, WITH POLYPECTOMY USING SNARE;  Surgeon: Saravanan Andersen MD;  Location: [facility] ENDOSCOPY;  Service: Endoscopy;  Laterality: N/A;    ESOPHAGOGASTRODUODENOSCOPY      LIVER BIOPSY      POLYPECTOMY      TENDON REPAIR Right     foot       Family History:   Family History   Problem Relation Name Age of Onset    Lung cancer Mother      Stroke Father      Dementia Father      Liver cancer Brother     .    Social History:   Social History     Socioeconomic History    Marital status: Single   Tobacco Use    Smoking status: Former     Current packs/day:  0.00     Average packs/day: 0.5 packs/day for 48.7 years (24.3 ttl pk-yrs)     Types: Cigarettes     Start date:      Quit date: 2023     Years since quittin.7    Smokeless tobacco: Current    Tobacco comments:     Quit 1 month ago   Substance and Sexual Activity    Alcohol use: Not Currently    Drug use: Never    Sexual activity: Not Currently     Social Determinants of Health     Financial Resource Strain: Medium Risk (2022)    Overall Financial Resource Strain (CARDIA)     Difficulty of Paying Living Expenses: Somewhat hard   Food Insecurity: No Food Insecurity (2022)    Hunger Vital Sign     Worried About Running Out of Food in the Last Year: Never true     Ran Out of Food in the Last Year: Never true   Transportation Needs: No Transportation Needs (2022)    PRAPARE - Transportation     Lack of Transportation (Medical): No     Lack of Transportation (Non-Medical): No   Physical Activity: Insufficiently Active (2022)    Exercise Vital Sign     Days of Exercise per Week: 2 days     Minutes of Exercise per Session: 60 min   Stress: Stress Concern Present (2022)    [employer] [place] [employer] - Occupational Stress Questionnaire     Feeling of Stress : Rather much   Housing Stability: Low Risk  (2022)    Housing Stability Vital Sign     Unable to Pay for Housing in the Last Year: No     Number of Places Lived in the Last Year: 1     Unstable Housing in the Last Year: No       Review of patient's allergies indicates:  No Known Allergies    Medications:   Medications Prior to Admission   Medication Sig Dispense Refill Last Dose    ascorbic acid, vitamin C, (VITAMIN C) 100 MG tablet Take 100 mg by mouth once daily.   Taking    lisinopriL-hydrochlorothiazide (PRINZIDE,ZESTORETIC) 20-25 mg Tab Take 1 tablet by mouth once daily. 90 tablet 1 Taking    multivitamin with iron Tab Take 1 tablet by mouth once daily.   Taking    omega-3 fatty acids/fish oil (FISH  "OIL-OMEGA-3 FATTY ACIDS) 300-1,000 mg capsule Take 1 capsule by mouth once daily.   Taking    aspirin (ECOTRIN) 81 MG EC tablet Take 81 mg by mouth every other day. (Patient not taking: Reported on 4/16/2024)   Not Taking         Physical Exam:    Vital Signs: There were no vitals filed for this visit.  Ht 5' 6" (1.676 m)   Wt 88.5 kg (195 lb)   BMI 31.47 kg/m²     General: NAD  Lungs: NWOB on RA  Heart: RR  Abdomen: Soft, non-tender, non-distended        Labs:  Lab Results   Component Value Date    WBC 7.05 05/14/2024    HGB 14.6 05/14/2024    HCT 41.4 (L) 05/14/2024    MCV 91.2 05/14/2024     (L) 05/14/2024     Lab Results   Component Value Date    INR 1.0 02/19/2024     Lab Results   Component Value Date     05/14/2024    K 3.8 05/14/2024    CO2 27 05/14/2024    BUN 17.0 05/14/2024    CREATININE 0.79 05/14/2024    LABPROT 13.2 02/19/2024    LABPROT 7.8 (H) 02/19/2024    ALBUMIN 3.7 02/19/2024    BILITOT 0.6 02/19/2024    ALKPHOS 38 (L) 02/19/2024    ALT 26 02/19/2024    AST 20 02/19/2024       Assessment and Plan:   64 y/o M with a Hx of HTN, tobacco use, alcohol use disorder with MELD 9 alcoholic cirrhosis with a previous diagnosis of esophageal varices presenting today to undergo f/u EGD for surveillance.     - r/b/a of procedure discussed with patient and consent obtained  - Proceed with EGD today      Long Le MD  [employer] General Surgery, PGY2      6/3/2024  10:28 AM    "

## 2024-06-03 NOTE — PLAN OF CARE
Procedure complete returned to room awake alert denies discomfort -tolerating water at this time-sisters at bedside

## 2024-06-03 NOTE — TRANSFER OF CARE
"Anesthesia Transfer of Care Note    Patient: Dawson Guerra    Procedure(s) Performed: Procedure(s) (LRB):  EGD (ESOPHAGOGASTRODUODENOSCOPY) (N/A)    Patient location: GI    Anesthesia Type: general    Transport from OR: Transported from OR on room air with adequate spontaneous ventilation    Post pain: adequate analgesia    Post assessment: no apparent anesthetic complications    Post vital signs: stable    Level of consciousness: awake    Nausea/Vomiting: no nausea/vomiting    Complications: none    Transfer of care protocol was followed      Last vitals: Visit Vitals  /72 (BP Location: Left arm, Patient Position: Lying)   Pulse 80   Temp 36.9 °C (98.4 °F) (Oral)   Resp 16   Ht 5' 6" (1.676 m)   Wt 88.6 kg (195 lb 6.4 oz)   SpO2 99%   BMI 31.54 kg/m²     "

## 2024-06-04 VITALS
SYSTOLIC BLOOD PRESSURE: 115 MMHG | TEMPERATURE: 98 F | BODY MASS INDEX: 31.4 KG/M2 | HEART RATE: 68 BPM | RESPIRATION RATE: 18 BRPM | OXYGEN SATURATION: 98 % | HEIGHT: 66 IN | DIASTOLIC BLOOD PRESSURE: 64 MMHG | WEIGHT: 195.38 LBS

## 2024-06-05 ENCOUNTER — HOSPITAL ENCOUNTER (OUTPATIENT)
Dept: RADIOLOGY | Facility: HOSPITAL | Age: 64
Discharge: HOME | End: 2024-06-05
Attending: STUDENT IN AN ORGANIZED HEALTH CARE EDUCATION/TRAINING PROGRAM
Payer: MEDICAID

## 2024-06-05 DIAGNOSIS — K76.9 LIVER LESION: ICD-10-CM

## 2024-06-05 LAB
CREAT SERPL-MCNC: 0.76 MG/DL (ref 0.73–1.18)
GFR SERPLBLD CREATININE-BSD FMLA CKD-EPI: >60 ML/MIN/1.73/M2

## 2024-06-05 PROCEDURE — 74178 CT ABD&PLV WO CNTR FLWD CNTR: CPT | Mod: TC

## 2024-06-05 PROCEDURE — 82565 ASSAY OF CREATININE: CPT | Performed by: STUDENT IN AN ORGANIZED HEALTH CARE EDUCATION/TRAINING PROGRAM

## 2024-06-05 PROCEDURE — 25500020 PHARM REV CODE 255

## 2024-06-05 RX ADMIN — IOHEXOL 100 ML: 350 INJECTION, SOLUTION INTRAVENOUS at 10:06

## 2024-07-22 PROBLEM — Z00.00 WELL ADULT EXAM: Status: RESOLVED | Noted: 2024-04-16 | Resolved: 2024-07-22

## 2024-08-19 ENCOUNTER — OFFICE VISIT (OUTPATIENT)
Dept: GASTROENTEROLOGY | Facility: CLINIC | Age: 64
End: 2024-08-19
Payer: MEDICARE

## 2024-08-19 VITALS
HEART RATE: 77 BPM | WEIGHT: 192 LBS | SYSTOLIC BLOOD PRESSURE: 113 MMHG | OXYGEN SATURATION: 98 % | BODY MASS INDEX: 30.86 KG/M2 | DIASTOLIC BLOOD PRESSURE: 67 MMHG | HEIGHT: 66 IN | TEMPERATURE: 99 F | RESPIRATION RATE: 16 BRPM

## 2024-08-19 DIAGNOSIS — K75.81 NASH (NONALCOHOLIC STEATOHEPATITIS): Primary | ICD-10-CM

## 2024-08-19 LAB
ALBUMIN SERPL-MCNC: 3.8 G/DL (ref 3.4–4.8)
ALBUMIN/GLOB SERPL: 0.9 RATIO (ref 1.1–2)
ALP SERPL-CCNC: 57 UNIT/L (ref 40–150)
ALT SERPL-CCNC: 25 UNIT/L (ref 0–55)
ANION GAP SERPL CALC-SCNC: 10 MEQ/L
AST SERPL-CCNC: 21 UNIT/L (ref 5–34)
BILIRUB SERPL-MCNC: 0.4 MG/DL
BUN SERPL-MCNC: 26.9 MG/DL (ref 8.4–25.7)
CALCIUM SERPL-MCNC: 10.1 MG/DL (ref 8.8–10)
CHLORIDE SERPL-SCNC: 105 MMOL/L (ref 98–107)
CO2 SERPL-SCNC: 24 MMOL/L (ref 23–31)
CREAT SERPL-MCNC: 1.11 MG/DL (ref 0.73–1.18)
CREAT/UREA NIT SERPL: 24
GFR SERPLBLD CREATININE-BSD FMLA CKD-EPI: >60 ML/MIN/1.73/M2
GLOBULIN SER-MCNC: 4.4 GM/DL (ref 2.4–3.5)
GLUCOSE SERPL-MCNC: 81 MG/DL (ref 82–115)
INR PPP: 1
POTASSIUM SERPL-SCNC: 3.5 MMOL/L (ref 3.5–5.1)
PROT SERPL-MCNC: 8.2 GM/DL (ref 5.8–7.6)
PROTHROMBIN TIME: 13.4 SECONDS (ref 11.4–14)
SODIUM SERPL-SCNC: 139 MMOL/L (ref 136–145)

## 2024-08-19 PROCEDURE — 99213 OFFICE O/P EST LOW 20 MIN: CPT | Mod: PBBFAC | Performed by: STUDENT IN AN ORGANIZED HEALTH CARE EDUCATION/TRAINING PROGRAM

## 2024-08-19 PROCEDURE — 80053 COMPREHEN METABOLIC PANEL: CPT | Performed by: STUDENT IN AN ORGANIZED HEALTH CARE EDUCATION/TRAINING PROGRAM

## 2024-08-19 PROCEDURE — 36415 COLL VENOUS BLD VENIPUNCTURE: CPT | Performed by: STUDENT IN AN ORGANIZED HEALTH CARE EDUCATION/TRAINING PROGRAM

## 2024-08-19 PROCEDURE — 85610 PROTHROMBIN TIME: CPT | Performed by: STUDENT IN AN ORGANIZED HEALTH CARE EDUCATION/TRAINING PROGRAM

## 2024-08-19 NOTE — PROGRESS NOTES
Subjective     Patient ID: Dawson Guerra is a 64 y.o. male.    Chief Complaint: Alcoholic Cirrhosis (No complaints)    64-year-old male with history of hypertension obesity, tobacco use and alcohol abuse presenting here to GI clinic for follow-up appointment for his cirrhosis.  Patient last seen by me November 22, 2021, at that time his cirrhotic liver disease has been well compensated, his MELD score  was 9, and his Child Neff class A. His last EGD performed June of 2021 and head revealed some small esophageal varices grade 1 in the mid and distal esophagus, nonbleeding.  His last abdominal ultrasound was February 2022 that revealed a cirrhotic appearing liver with no focal lesions present.  He has not had any hospitalizations or other ER visits for decompensation of his cirrhosis.  My recommendation at that time had been for him to have repeat EGD in 2023, and he has been avoiding alcohol since that time.       He initially was seen by our clinic after obtaining records from [facility] in January of 2020, his workup in the past has also included a liver biopsy in February of 2020 with findings of cirrhosis with mild steatosis.  The pathology did not elaborate on micro versus macrovesicular steatosis are common or need further findings other than cirrhosis.  The case has been discussed with staff here and was recommended to treat his alcohol plus/minus King cirrhosis.  Has undergone a colonoscopy in the past with Dr. Colunga November of 2019 with findings of 3 adenomatous polyps in the sigmoid colon and recommended recall of 3 years.  He does smoke 10 cigarettes daily and at that time was drinking alcohol on occasion.  He used to drink whiskey, rum and beer on the weekends for several years in the past.  Denied a family history of IBD, colon polyps or colon cancer.     5/23/22:  Denies any complaints today, denies any recent ER visits for decompensation of cirrhosis, denies any jaundice, abdominal swelling,  lower extremity swelling.  Only admits to easy bleeding with scratches or cuts of his upper extremities, I explained him likely result of thrombocytopenia from his cirrhotic liver disease.  He continues to avoid raw shellfish or NSAIDs.  Discussed his ultrasound result findings from February 2022, no focal lesions present.  He will be due for upcoming HCC screening with abdominal ultrasound August 2022.      12/19/22:  No complaints today, updated on HCC screening September this year, no masses or liver lesions present.  Will need to get updated MELD labs next month, and he is slated for EGD and colonoscopy May 2023.  Denies any melena, hematochezia, jaundice, lower extremity swelling no recent emergency room visits or hospitalizations for decompensated cirrhotic liver disease.       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  He denies any orthostatic like symptoms including feeling lightheaded or dizzy, or orthostatic when he stands up or sits up from lying down.  He states usually has to go through 1 or 2 shots per day while working.  He is also on HCTZ for blood pressure control.  Encouraged increase p.o. intake as well as hydration given her current heat wave in given his cirrhotic status can certainly lead to decompensation if he is volume depleted.  He is refrain from alcohol for nearly 36 months, and no longer smokes.  Having 2 bowel movements per day, nonbloody, no melena or hematochezia.  Current MELD is 7, Child Neff class A.  Most up-to-date imaging studies include a CT abdomen pelvis from May of this year which revealed baseline cirrhosis of the liver with no liver lesions noted.  Also up-to-date on colonoscopy performed this may, that revealed Six 2 to 5  mm polyps in the rectum, in the  descending colon, in the ascending colon and in the cecum.  Recall in 3 years.     2/19/24:  Patient with no complaints today, has been staying well hydrated since last appointment where he had been hypotensive due to decreased water intake.  Endorses 2 bowel movements per day, nonbloody, denies any hematemesis, jaundice.  Overdue on updated MELD labs, last done in March 2023, also needs updated endoscopy last performed June of 2021.  Endorses weight gain, has been eating processed foods and high quantities, high sodium.      Today's visit:  No complaints today, endorsing 3 bowel movements per day without straining, no melena hematochezia no jaundice no hematemesis.  Only stated he recently had a muscle strain in his back when moving a ladder, denied NSAID use.  Triple phase CT had been performed 06/05/2024 which was negative for any liver lesions.  MELD labs do need updated today.  Endoscopy performed 06/03/2024 showed normal E/S/D.  Review of Systems   Constitutional: Negative.    HENT: Negative.     Eyes: Negative.    Respiratory: Negative.     Cardiovascular: Negative.    Gastrointestinal: Negative.    Endocrine: Negative.    Genitourinary: Negative.    Musculoskeletal:  Positive for back pain.   Allergic/Immunologic: Negative.    Neurological: Negative.    Hematological: Negative.    Psychiatric/Behavioral: Negative.          Objective   Vitals:    08/19/24 1310   BP: 113/67   Pulse: 77   Resp: 16   Temp: 98.5 °F (36.9 °C)         Physical Exam  Constitutional:       Appearance: Normal appearance. He is obese.   HENT:      Head: Normocephalic and atraumatic.      Mouth/Throat:      Mouth: Mucous membranes are moist.      Pharynx: Oropharynx is clear.   Eyes:      Conjunctiva/sclera: Conjunctivae normal.      Pupils: Pupils are equal, round, and reactive to light.   Cardiovascular:      Rate and Rhythm: Normal rate and regular rhythm.      Pulses: Normal pulses.      Heart  sounds: Normal heart sounds.   Pulmonary:      Effort: Pulmonary effort is normal.      Breath sounds: Normal breath sounds.   Abdominal:      General: Abdomen is flat. Bowel sounds are normal.   Musculoskeletal:         General: Normal range of motion.   Skin:     General: Skin is warm and dry.   Neurological:      General: No focal deficit present.      Mental Status: He is alert and oriented to person, place, and time. Mental status is at baseline.        Assessment and Plan     1. ROCHA (nonalcoholic steatohepatitis)  -     Protime-INR  -     Comprehensive Metabolic Panel        Background:  Alcohol: yes, in the past     Tobacco:  yes, in the past     Liver disease: ROCHA     MELD-Na:  Overdue by a year, updated today.  Child-Neff Class:  See for MELD     Transplant: not under evaluation, low MELD     Cirrhosis is decompensated with:     Ascites: no  Spontaneous bacterial peritonitis: No  Hepatic Encephalopathy: No  Hepatocellular carcinoma: No  Hepatorenal syndrome: No  Hyponatremia: No  Muscle wasting: No  Portal vein thrombosis: No     Screening:  Last EGD:  Hanna 3, 2024: Normal E/S/D.        Last imaging study:  Triple phase CT abdomen pelvis, 06/05/2024: No hepatic lesions seen.     CIRRHOSIS COUNSELING:  - strict abstinence of alcohol use  - low sodium (salt) 2000mg per day  - Nutrition: 25-30kcal (calorie per body weight in kilogram) per day  - No need to restrict protein in diet  - High protein diet: 1.2-1.5 gram/kg (protein per body weight in kg) per day to prevent muscle mass loss  - Resistance exercises for muscle strength  - Avoid raw seafoods due to risk of fatal Vibrio vulnificus infection  - Avoid Non-steroidal anti-inflammatory drugs (NSAIDs) such as ibuprofen, Motrin, naproxen, Aleve due to risk of kidney damage  - Can take acetaminophen (tylenol), no more than 2000mg per day  - Compliance with all medications  - Ultrasound or MRI of the liver every 6 months for liver cancer screening  - Upper  endoscopy every 1-2 years to screen for varices       Six-month follow up, MELD labs to be performed in the office today, next HCC screening with abdominal ultrasound will be due in December of this year.    Avoid NSAID use was counseled extensively.           Follow up in about 6 months (around 2/19/2025).

## 2024-10-04 ENCOUNTER — OFFICE VISIT (OUTPATIENT)
Dept: INTERNAL MEDICINE | Facility: CLINIC | Age: 64
End: 2024-10-04
Payer: MEDICARE

## 2024-10-04 VITALS
SYSTOLIC BLOOD PRESSURE: 126 MMHG | HEIGHT: 66 IN | TEMPERATURE: 99 F | HEART RATE: 68 BPM | RESPIRATION RATE: 18 BRPM | DIASTOLIC BLOOD PRESSURE: 76 MMHG | BODY MASS INDEX: 30.7 KG/M2 | WEIGHT: 191 LBS

## 2024-10-04 DIAGNOSIS — Z12.5 PROSTATE CANCER SCREENING: ICD-10-CM

## 2024-10-04 DIAGNOSIS — I10 PRIMARY HYPERTENSION: ICD-10-CM

## 2024-10-04 DIAGNOSIS — I10 HYPERTENSION, UNSPECIFIED TYPE: ICD-10-CM

## 2024-10-04 DIAGNOSIS — Z72.0 TOBACCO USER: ICD-10-CM

## 2024-10-04 DIAGNOSIS — R31.21 ASYMPTOMATIC MICROSCOPIC HEMATURIA: ICD-10-CM

## 2024-10-04 DIAGNOSIS — R79.89 ABNORMAL CBC: ICD-10-CM

## 2024-10-04 DIAGNOSIS — E78.1 HYPERTRIGLYCERIDEMIA: ICD-10-CM

## 2024-10-04 DIAGNOSIS — K74.60 LIVER CIRRHOSIS SECONDARY TO NASH (NONALCOHOLIC STEATOHEPATITIS): Primary | ICD-10-CM

## 2024-10-04 DIAGNOSIS — K75.81 LIVER CIRRHOSIS SECONDARY TO NASH (NONALCOHOLIC STEATOHEPATITIS): Primary | ICD-10-CM

## 2024-10-04 DIAGNOSIS — D12.8 ADENOMATOUS RECTAL POLYP: ICD-10-CM

## 2024-10-04 PROCEDURE — 99213 OFFICE O/P EST LOW 20 MIN: CPT | Mod: PBBFAC | Performed by: NURSE PRACTITIONER

## 2024-10-04 RX ORDER — LISINOPRIL AND HYDROCHLOROTHIAZIDE 20; 25 MG/1; MG/1
1 TABLET ORAL DAILY
Qty: 90 TABLET | Refills: 1 | Status: SHIPPED | OUTPATIENT
Start: 2024-10-04 | End: 2025-04-02

## 2024-10-04 NOTE — PROGRESS NOTES
Patient ID: [id number]     Chief Complaint: Results        HPI:     HPI      Dawson Guerra is a 64 y.o. male here today for a follow up.         Immunizations:   Immunization History   Administered Date(s) Administered    COVID-19, MRNA, LN-S, PF (Pfizer) (Purple Cap) 03/17/2021, 04/07/2021, 11/09/2021    Influenza - Quadrivalent 10/14/2021    Influenza - Quadrivalent - PF *Preferred* (6 months and older) 01/31/2018, 10/14/2021    Influenza - Trivalent - Fluarix, Flulaval, Fluzone, Afluria - PF 01/31/2018    Tdap 07/12/2021        -------------------------------------    Elevated liver enzymes    Hypertension    Microhematuria    Positive FIT (fecal immunochemical test)    Prediabetes    Tobacco user    Unspecified cirrhosis of liver        Past Surgical History:   Procedure Laterality Date    COLONOSCOPY      COLONOSCOPY, WITH POLYPECTOMY USING SNARE N/A 5/18/2023    Procedure: COLONOSCOPY, WITH POLYPECTOMY USING SNARE;  Surgeon: Saravanan Andersen MD;  Location: [facility] ENDOSCOPY;  Service: Endoscopy;  Laterality: N/A;    ESOPHAGOGASTRODUODENOSCOPY      ESOPHAGOGASTRODUODENOSCOPY N/A 6/3/2024    Procedure: EGD (ESOPHAGOGASTRODUODENOSCOPY);  Surgeon: Rocio Jacob MD;  Location: [facility] ENDOSCOPY;  Service: Gastroenterology;  Laterality: N/A;    LIVER BIOPSY      POLYPECTOMY      TENDON REPAIR Right     foot       Review of patient's allergies indicates:  No Known Allergies    Current Outpatient Medications   Medication Instructions    ascorbic acid (vitamin C) (VITAMIN C) 100 mg, Daily    aspirin (ECOTRIN) 81 mg, Every other day    lisinopriL-hydrochlorothiazide (PRINZIDE,ZESTORETIC) 20-25 mg Tab 1 tablet, Oral, Daily    multivitamin with iron Tab 1 tablet, Daily    omega-3 fatty acids/fish oil (FISH OIL-OMEGA-3 FATTY ACIDS) 300-1,000 mg capsule 1 capsule, Daily       Social History     Socioeconomic History    Marital status: Single   Tobacco Use    Smoking status: Former     Current packs/day: 0.00      Average packs/day: 0.5 packs/day for 48.7 years (24.3 ttl pk-yrs)     Types: Cigarettes     Start date:      Quit date: 2023     Years since quittin.0    Smokeless tobacco: Never    Tobacco comments:     Quit 1 month ago   Substance and Sexual Activity    Alcohol use: Not Currently    Drug use: Never    Sexual activity: Not Currently     Social Drivers of Health     Financial Resource Strain: Medium Risk (2022)    Overall Financial Resource Strain (CARDIA)     Difficulty of Paying Living Expenses: Somewhat hard   Food Insecurity: No Food Insecurity (2022)    Hunger Vital Sign     Worried About Running Out of Food in the Last Year: Never true     Ran Out of Food in the Last Year: Never true   Transportation Needs: No Transportation Needs (2022)    PRAPARE - Transportation     Lack of Transportation (Medical): No     Lack of Transportation (Non-Medical): No   Physical Activity: Insufficiently Active (2022)    Exercise Vital Sign     Days of Exercise per Week: 2 days     Minutes of Exercise per Session: 60 min   Stress: Stress Concern Present (2022)    [employer] [place] [employer] - Occupational Stress Questionnaire     Feeling of Stress : Rather much   Housing Stability: Low Risk  (2022)    Housing Stability Vital Sign     Unable to Pay for Housing in the Last Year: No     Number of Places Lived in the Last Year: 1     Unstable Housing in the Last Year: No        Family History   Problem Relation Name Age of Onset    Lung cancer Mother      Stroke Father      Dementia Father      Bladder Cancer Father      Liver cancer Brother          Patient Care Team:  Sanjuanita Colunga FNP as PCP - General (Family Medicine)     Subjective:     Review of Systems     See HPI for details    Constitutional: Denies Change in appetite. Denies Chills. Denies Fever. Denies Night sweats.  Eye: Denies Blurred vision. Denies Discharge. Denies Eye pain.  ENT: Denies Decreased hearing.  "Denies Sore throat. Denies Swollen glands.  Respiratory: Denies Cough. Denies Shortness of breath. Denies Shortness of breath with exertion. Denies Wheezing.  Cardiovascular: DeniesChest pain at rest. Denies Chest pain with exertion. Denies Irregular heartbeat. Denies Palpitations. Denies Edema.  Gastrointestinal: Denies Abdominal pain. DeniesDiarrhea. Denies Nausea. Denies Vomiting. Denies Hematemesis or Hematochezia.  Genitourinary: Denies Dysuria. Denies Urinary frequency. Denies Urinary urgency. Denies Blood in urine.  Endocrine: Denies Cold intolerance. Denies Excessive thirst. Denies Heat intolerance. Denies Weight loss. Denies Weight gain.  Musculoskeletal: Denies Painful joints. Denies Weakness.  Integumentary: Denies Rash. Denies Itching. Denies Dry skin.  Neurologic: Denies Dizziness. Denies Fainting. Denies Headache.  Psychiatric: Denies Depression. Denies Anxiety. Denies Suicidal/Homicidal ideations.    All Other ROS: Negative except as stated in HPI.       Objective:     Visit Vitals  /76 (BP Location: Right arm, Patient Position: Sitting)   Pulse 68   Temp 98.5 °F (36.9 °C) (Oral)   Resp 18   Ht 5' 6" (1.676 m)   Wt 86.6 kg (191 lb)   BMI 30.83 kg/m²       Physical Exam    General: Alert and oriented, No acute distress.  Head: Normocephalic, Atraumatic.  Eye: Pupils are equal, round and reactive to light, Extraocular movements are intact, Sclera non-icteric.  Ears/Nose/Throat: Normal, Mucosa moist,Clear.  Neck/Thyroid: Supple, Non-tender, No carotid bruit, No lymphadenopathy, No JVD, Full range of motion.  Respiratory: Clear to auscultation bilaterally; No wheezes, rales or rhonchi,Non-labored respirations, Symmetrical chest wall expansion.  Cardiovascular: Regular rate and rhythm, S1/S2 normal, No murmurs, rubs or gallops.  Gastrointestinal: Soft, Non-tender, Non-distended, Normal bowel sounds, No palpable organomegaly.  Musculoskeletal: Normal range of motion.  Integumentary: Warm, Dry, Intact, " No suspicious lesions or rashes.  Extremities: No clubbing, cyanosis or edema  Neurologic: No focal deficits, Cranial Nerves II-XII are grossly intact, Motor strength normal upper and lower extremities, Sensory exam intact.  Psychiatric: Normal interaction, Coherent speech, Euthymic mood, Appropriate affect       Labs Reviewed:     Chemistry:  Lab Results   Component Value Date     08/19/2024    K 3.5 08/19/2024    BUN 26.9 (H) 08/19/2024    CREATININE 1.11 08/19/2024    EGFRNORACEVR >60 08/19/2024    GLUCOSE 81 (L) 08/19/2024    CALCIUM 10.1 (H) 08/19/2024    ALKPHOS 57 08/19/2024    LABPROT 13.4 08/19/2024    LABPROT 8.2 (H) 08/19/2024    ALBUMIN 3.8 08/19/2024    BILIDIR 0.3 03/31/2022    IBILI 0.40 03/31/2022    AST 21 08/19/2024    ALT 25 08/19/2024    URSEHOSA03QC 39.9 11/08/2023        Lab Results   Component Value Date    HGBA1C 4.4 05/14/2024        Hematology:  Lab Results   Component Value Date    WBC 7.05 05/14/2024    HGB 14.6 05/14/2024    HCT 41.4 (L) 05/14/2024     (L) 05/14/2024       Lipid Panel:  Lab Results   Component Value Date    CHOL 166 05/14/2024    HDL 28 (L) 05/14/2024    LDL 98.00 05/14/2024    TRIG 198 (H) 05/14/2024    TOTALCHOLEST 6 (H) 05/14/2024        Urine:  Lab Results   Component Value Date    APPEARANCEUA Clear 05/14/2024    SGUA 1.021 05/14/2024    PROTEINUA Negative 05/14/2024    KETONESUA Negative 05/14/2024    LEUKOCYTESUR 75 (A) 05/14/2024    RBCUA 0-5 05/14/2024    WBCUA 0-5 05/14/2024    BACTERIA None Seen 05/14/2024    SQEPUA None Seen 05/14/2024    HYALINECASTS None Seen 05/14/2024        Assessment:       ICD-10-CM ICD-9-CM   1. Liver cirrhosis secondary to ROCHA (nonalcoholic steatohepatitis)  K75.81 571.8    K74.60 571.5   2. Primary hypertension  I10 401.9   3. Asymptomatic microscopic hematuria  R31.21 599.72   4. Tobacco user  Z72.0 305.1   5. Adenomatous rectal polyp  D12.8 211.4   6. Prostate cancer screening  Z12.5 V76.44   7. Hypertriglyceridemia   E78.1 272.1   8. Abnormal CBC  R79.89 790.6   9. Hypertension, unspecified type  I10 401.9        Plan:     1. Liver cirrhosis secondary to ROCHA (nonalcoholic steatohepatitis) (Primary)  Pt followed in GI- Dr Olivia Cirrhosis cl. Keep appts.     2. Primary hypertension  BP controlled. Low fat low salt diet and exercise. Cont Lisinopril HCTZ as prescribed.     3. Asymptomatic microscopic hematuria  UA C&S cyto in 3 months.     4. Tobacco user  Encouraged smoking cessation. Education provided.     5. Adenomatous rectal polyp  Pt last colonoscopy done 5-18-23 with 6 polyps- no malignancy- suggest repeat in 3 years due 5-2026.     6. Prostate cancer screening  UTD PSA.     7. Hypertriglyceridemia  Low fat diet and exercise encouraged. FLP in 3 months.     8. Abnormal CBC  CBC stable. CBC in 3 months.          Follow up in about 3 months (around 1/4/2025) for with labs 1 week prior to appt. . In addition to their scheduled follow up, the patient has also been instructed to follow up on as needed basis.     Future Appointments   Date Time Provider Department Center   2/24/2025  1:30 PM Jeromy Olivia MD [facility] GASTRO [place]        BAIRON Jacobs

## 2025-01-13 ENCOUNTER — LAB VISIT (OUTPATIENT)
Dept: LAB | Facility: HOSPITAL | Age: 65
End: 2025-01-13
Attending: STUDENT IN AN ORGANIZED HEALTH CARE EDUCATION/TRAINING PROGRAM
Payer: COMMERCIAL

## 2025-01-13 DIAGNOSIS — I10 PRIMARY HYPERTENSION: Primary | ICD-10-CM

## 2025-01-13 DIAGNOSIS — K76.9 LIVER LESION: ICD-10-CM

## 2025-01-13 DIAGNOSIS — D12.4 ADENOMATOUS POLYP OF DESCENDING COLON: ICD-10-CM

## 2025-01-13 DIAGNOSIS — E83.52 HYPERCALCEMIA: ICD-10-CM

## 2025-01-13 DIAGNOSIS — K75.81 LIVER CIRRHOSIS SECONDARY TO NASH (NONALCOHOLIC STEATOHEPATITIS): ICD-10-CM

## 2025-01-13 DIAGNOSIS — K63.5 POLYP OF ASCENDING COLON: ICD-10-CM

## 2025-01-13 DIAGNOSIS — K74.60 LIVER CIRRHOSIS SECONDARY TO NASH (NONALCOHOLIC STEATOHEPATITIS): ICD-10-CM

## 2025-01-13 LAB
ANION GAP SERPL CALC-SCNC: 8 MEQ/L
BACTERIA #/AREA URNS AUTO: ABNORMAL /HPF
BASOPHILS # BLD AUTO: 0.06 X10(3)/MCL
BASOPHILS NFR BLD AUTO: 0.7 %
BILIRUB UR QL STRIP.AUTO: NEGATIVE
BUN SERPL-MCNC: 20.4 MG/DL (ref 8.4–25.7)
CALCIUM SERPL-MCNC: 9.9 MG/DL (ref 8.8–10)
CHLORIDE SERPL-SCNC: 103 MMOL/L (ref 98–107)
CHOLEST SERPL-MCNC: 192 MG/DL
CHOLEST/HDLC SERPL: 6 {RATIO} (ref 0–5)
CLARITY UR: CLEAR
CO2 SERPL-SCNC: 28 MMOL/L (ref 23–31)
COLOR UR AUTO: ABNORMAL
CREAT SERPL-MCNC: 0.73 MG/DL (ref 0.72–1.25)
CREAT/UREA NIT SERPL: 28
EOSINOPHIL # BLD AUTO: 0.22 X10(3)/MCL (ref 0–0.9)
EOSINOPHIL NFR BLD AUTO: 2.6 %
ERYTHROCYTE [DISTWIDTH] IN BLOOD BY AUTOMATED COUNT: 13.8 % (ref 11.5–17)
GFR SERPLBLD CREATININE-BSD FMLA CKD-EPI: >60 ML/MIN/1.73/M2
GLUCOSE SERPL-MCNC: 99 MG/DL (ref 82–115)
GLUCOSE UR QL STRIP: NORMAL
HCT VFR BLD AUTO: 46.5 % (ref 42–52)
HDLC SERPL-MCNC: 32 MG/DL (ref 35–60)
HGB BLD-MCNC: 15.9 G/DL (ref 14–18)
HGB UR QL STRIP: NEGATIVE
HYALINE CASTS #/AREA URNS LPF: ABNORMAL /LPF
IMM GRANULOCYTES # BLD AUTO: 0.13 X10(3)/MCL (ref 0–0.04)
IMM GRANULOCYTES NFR BLD AUTO: 1.6 %
KETONES UR QL STRIP: NEGATIVE
LDLC SERPL CALC-MCNC: 97 MG/DL (ref 50–140)
LEUKOCYTE ESTERASE UR QL STRIP: 250
LYMPHOCYTES # BLD AUTO: 2.31 X10(3)/MCL (ref 0.6–4.6)
LYMPHOCYTES NFR BLD AUTO: 27.6 %
MCH RBC QN AUTO: 32 PG (ref 27–31)
MCHC RBC AUTO-ENTMCNC: 34.2 G/DL (ref 33–36)
MCV RBC AUTO: 93.6 FL (ref 80–94)
MONOCYTES # BLD AUTO: 0.77 X10(3)/MCL (ref 0.1–1.3)
MONOCYTES NFR BLD AUTO: 9.2 %
MUCOUS THREADS URNS QL MICRO: ABNORMAL /LPF
NEUTROPHILS # BLD AUTO: 4.88 X10(3)/MCL (ref 2.1–9.2)
NEUTROPHILS NFR BLD AUTO: 58.3 %
NITRITE UR QL STRIP: NEGATIVE
NRBC BLD AUTO-RTO: 0 %
PH UR STRIP: 6.5 [PH]
PLATELET # BLD AUTO: 139 X10(3)/MCL (ref 130–400)
PLATELETS.RETICULATED NFR BLD AUTO: 3 % (ref 0.9–11.2)
PMV BLD AUTO: 10 FL (ref 7.4–10.4)
POTASSIUM SERPL-SCNC: 3.9 MMOL/L (ref 3.5–5.1)
PROT UR QL STRIP: NEGATIVE
RBC # BLD AUTO: 4.97 X10(6)/MCL (ref 4.7–6.1)
RBC #/AREA URNS AUTO: ABNORMAL /HPF
SODIUM SERPL-SCNC: 139 MMOL/L (ref 136–145)
SP GR UR STRIP.AUTO: 1.02 (ref 1–1.03)
SQUAMOUS #/AREA URNS LPF: ABNORMAL /HPF
TRIGL SERPL-MCNC: 316 MG/DL (ref 34–140)
UROBILINOGEN UR STRIP-ACNC: NORMAL
VLDLC SERPL CALC-MCNC: 63 MG/DL
WBC # BLD AUTO: 8.37 X10(3)/MCL (ref 4.5–11.5)
WBC #/AREA URNS AUTO: ABNORMAL /HPF

## 2025-01-13 PROCEDURE — 80048 BASIC METABOLIC PNL TOTAL CA: CPT

## 2025-01-13 PROCEDURE — 81001 URINALYSIS AUTO W/SCOPE: CPT

## 2025-01-13 PROCEDURE — 80061 LIPID PANEL: CPT

## 2025-01-13 PROCEDURE — 85025 COMPLETE CBC W/AUTO DIFF WBC: CPT

## 2025-01-13 PROCEDURE — 36415 COLL VENOUS BLD VENIPUNCTURE: CPT

## 2025-01-14 ENCOUNTER — OFFICE VISIT (OUTPATIENT)
Dept: INTERNAL MEDICINE | Facility: CLINIC | Age: 65
End: 2025-01-14
Payer: COMMERCIAL

## 2025-01-14 VITALS
RESPIRATION RATE: 18 BRPM | TEMPERATURE: 99 F | HEIGHT: 66 IN | DIASTOLIC BLOOD PRESSURE: 74 MMHG | HEART RATE: 74 BPM | WEIGHT: 197 LBS | SYSTOLIC BLOOD PRESSURE: 138 MMHG | BODY MASS INDEX: 31.66 KG/M2

## 2025-01-14 DIAGNOSIS — Z00.00 WELL ADULT EXAM: ICD-10-CM

## 2025-01-14 DIAGNOSIS — D12.8 ADENOMATOUS RECTAL POLYP: ICD-10-CM

## 2025-01-14 DIAGNOSIS — Z72.0 TOBACCO USER: ICD-10-CM

## 2025-01-14 DIAGNOSIS — Z12.5 PROSTATE CANCER SCREENING: ICD-10-CM

## 2025-01-14 DIAGNOSIS — K74.60 LIVER CIRRHOSIS SECONDARY TO NASH (NONALCOHOLIC STEATOHEPATITIS): Primary | ICD-10-CM

## 2025-01-14 DIAGNOSIS — R31.21 ASYMPTOMATIC MICROSCOPIC HEMATURIA: ICD-10-CM

## 2025-01-14 DIAGNOSIS — I10 PRIMARY HYPERTENSION: ICD-10-CM

## 2025-01-14 DIAGNOSIS — K75.81 LIVER CIRRHOSIS SECONDARY TO NASH (NONALCOHOLIC STEATOHEPATITIS): Primary | ICD-10-CM

## 2025-01-14 DIAGNOSIS — E78.1 HYPERTRIGLYCERIDEMIA: ICD-10-CM

## 2025-01-14 DIAGNOSIS — I10 HYPERTENSION, UNSPECIFIED TYPE: ICD-10-CM

## 2025-01-14 PROCEDURE — 3008F BODY MASS INDEX DOCD: CPT | Mod: CPTII,,, | Performed by: NURSE PRACTITIONER

## 2025-01-14 PROCEDURE — 3075F SYST BP GE 130 - 139MM HG: CPT | Mod: CPTII,,, | Performed by: NURSE PRACTITIONER

## 2025-01-14 PROCEDURE — 99214 OFFICE O/P EST MOD 30 MIN: CPT | Mod: S$PBB,,, | Performed by: NURSE PRACTITIONER

## 2025-01-14 PROCEDURE — 1126F AMNT PAIN NOTED NONE PRSNT: CPT | Mod: CPTII,,, | Performed by: NURSE PRACTITIONER

## 2025-01-14 PROCEDURE — 3288F FALL RISK ASSESSMENT DOCD: CPT | Mod: CPTII,,, | Performed by: NURSE PRACTITIONER

## 2025-01-14 PROCEDURE — 1159F MED LIST DOCD IN RCRD: CPT | Mod: CPTII,,, | Performed by: NURSE PRACTITIONER

## 2025-01-14 PROCEDURE — 99213 OFFICE O/P EST LOW 20 MIN: CPT | Mod: PBBFAC | Performed by: NURSE PRACTITIONER

## 2025-01-14 PROCEDURE — 1101F PT FALLS ASSESS-DOCD LE1/YR: CPT | Mod: CPTII,,, | Performed by: NURSE PRACTITIONER

## 2025-01-14 PROCEDURE — 3078F DIAST BP <80 MM HG: CPT | Mod: CPTII,,, | Performed by: NURSE PRACTITIONER

## 2025-01-14 RX ORDER — LISINOPRIL AND HYDROCHLOROTHIAZIDE 20; 25 MG/1; MG/1
1 TABLET ORAL DAILY
Qty: 90 TABLET | Refills: 1 | Status: SHIPPED | OUTPATIENT
Start: 2025-01-14 | End: 2025-07-13

## 2025-01-14 NOTE — PROGRESS NOTES
Patient ID: [id number]     Chief Complaint: lab results        HPI:     HPI      Dawson Guerra is a 64 y.o. male here today for a follow up.         Immunizations:   Immunization History   Administered Date(s) Administered    COVID-19, MRNA, LN-S, PF (Pfizer) (Purple Cap) 03/17/2021, 04/07/2021, 11/09/2021    Influenza - Quadrivalent 10/14/2021    Influenza - Quadrivalent - PF *Preferred* (6 months and older) 01/31/2018, 10/14/2021    Influenza - Trivalent - Fluarix, Flulaval, Fluzone, Afluria - PF 01/31/2018    Tdap 07/12/2021        -------------------------------------    Elevated liver enzymes    Hypertension    Microhematuria    Positive FIT (fecal immunochemical test)    Prediabetes    Tobacco user    Unspecified cirrhosis of liver        Past Surgical History:   Procedure Laterality Date    COLONOSCOPY      COLONOSCOPY, WITH POLYPECTOMY USING SNARE N/A 5/18/2023    Procedure: COLONOSCOPY, WITH POLYPECTOMY USING SNARE;  Surgeon: Saravanan Andersen MD;  Location: [facility] ENDOSCOPY;  Service: Endoscopy;  Laterality: N/A;    ESOPHAGOGASTRODUODENOSCOPY      ESOPHAGOGASTRODUODENOSCOPY N/A 6/3/2024    Procedure: EGD (ESOPHAGOGASTRODUODENOSCOPY);  Surgeon: Rocio Jacob MD;  Location: [facility] ENDOSCOPY;  Service: Gastroenterology;  Laterality: N/A;    LIVER BIOPSY      POLYPECTOMY      TENDON REPAIR Right     foot       Review of patient's allergies indicates:  No Known Allergies    Current Outpatient Medications   Medication Instructions    ascorbic acid (vitamin C) (VITAMIN C) 100 mg, Daily    aspirin (ECOTRIN) 81 mg, Every other day    lisinopriL-hydrochlorothiazide (PRINZIDE,ZESTORETIC) 20-25 mg Tab 1 tablet, Oral, Daily    multivitamin with iron Tab 1 tablet, Daily    omega-3 fatty acids/fish oil (FISH OIL-OMEGA-3 FATTY ACIDS) 300-1,000 mg capsule 1 capsule, Daily       Social History     Socioeconomic History    Marital status: Single   Tobacco Use    Smoking status: Former     Current packs/day:  0.00     Average packs/day: 0.5 packs/day for 48.7 years (24.3 ttl pk-yrs)     Types: Cigarettes     Start date:      Quit date: 2023     Years since quittin.3    Smokeless tobacco: Never    Tobacco comments:     Quit 1 month ago   Substance and Sexual Activity    Alcohol use: Not Currently    Drug use: Never    Sexual activity: Not Currently     Social Drivers of Health     Financial Resource Strain: Medium Risk (2022)    Overall Financial Resource Strain (CARDIA)     Difficulty of Paying Living Expenses: Somewhat hard   Food Insecurity: No Food Insecurity (2022)    Hunger Vital Sign     Worried About Running Out of Food in the Last Year: Never true     Ran Out of Food in the Last Year: Never true   Transportation Needs: No Transportation Needs (2022)    PRAPARE - Transportation     Lack of Transportation (Medical): No     Lack of Transportation (Non-Medical): No   Physical Activity: Insufficiently Active (2022)    Exercise Vital Sign     Days of Exercise per Week: 2 days     Minutes of Exercise per Session: 60 min   Stress: Stress Concern Present (2022)    [employer] - Occupational Stress Questionnaire     Feeling of Stress : Rather much   Housing Stability: Low Risk  (2022)    Housing Stability Vital Sign     Unable to Pay for Housing in the Last Year: No     Number of Places Lived in the Last Year: 1     Unstable Housing in the Last Year: No        Family History   Problem Relation Name Age of Onset    Lung cancer Mother      Stroke Father      Dementia Father      Bladder Cancer Father      Liver cancer Brother          Patient Care Team:  Sanjuanita Colunga FNP as PCP - General (Family Medicine)     Subjective:     Review of Systems     See HPI for details    Constitutional: Denies Change in appetite. Denies Chills. Denies Fever. Denies Night sweats.  Eye: Denies Blurred vision. Denies Discharge. Denies Eye pain.  ENT: Denies Decreased  "hearing. Denies Sore throat. Denies Swollen glands.  Respiratory: Denies Cough. Denies Shortness of breath. Denies Shortness of breath with exertion. Denies Wheezing.  Cardiovascular: DeniesChest pain at rest. Denies Chest pain with exertion. Denies Irregular heartbeat. Denies Palpitations. Denies Edema.  Gastrointestinal: Denies Abdominal pain. DeniesDiarrhea. Denies Nausea. Denies Vomiting. Denies Hematemesis or Hematochezia.  Genitourinary: Denies Dysuria. Denies Urinary frequency. Denies Urinary urgency. Denies Blood in urine.  Endocrine: Denies Cold intolerance. Denies Excessive thirst. Denies Heat intolerance. Denies Weight loss. Denies Weight gain.  Musculoskeletal: Denies Painful joints. Denies Weakness.  Integumentary: Denies Rash. Denies Itching. Denies Dry skin.  Neurologic: Denies Dizziness. Denies Fainting. Denies Headache.  Psychiatric: Denies Depression. Denies Anxiety. Denies Suicidal/Homicidal ideations.    All Other ROS: Negative except as stated in HPI.       Objective:     Visit Vitals  /74 (BP Location: Right arm, Patient Position: Sitting)   Pulse 74   Temp 98.5 °F (36.9 °C) (Oral)   Resp 18   Ht 5' 6" (1.676 m)   Wt 89.4 kg (197 lb)   BMI 31.80 kg/m²       Physical Exam    General: Alert and oriented, No acute distress.  Head: Normocephalic, Atraumatic.  Eye: Pupils are equal, round and reactive to light, Extraocular movements are intact, Sclera non-icteric.  Ears/Nose/Throat: Normal, Mucosa moist,Clear.  Neck/Thyroid: Supple, Non-tender, No carotid bruit, No lymphadenopathy, No JVD, Full range of motion.  Respiratory: Clear to auscultation bilaterally; No wheezes, rales or rhonchi,Non-labored respirations, Symmetrical chest wall expansion.  Cardiovascular: Regular rate and rhythm, S1/S2 normal, No murmurs, rubs or gallops.  Gastrointestinal: Soft, Non-tender, Non-distended, Normal bowel sounds, No palpable organomegaly.  Musculoskeletal: Normal range of motion.  Integumentary: Warm, " Dry, Intact, No suspicious lesions or rashes.  Extremities: No clubbing, cyanosis or edema  Neurologic: No focal deficits, Cranial Nerves II-XII are grossly intact, Motor strength normal upper and lower extremities, Sensory exam intact.  Psychiatric: Normal interaction, Coherent speech, Euthymic mood, Appropriate affect       Labs Reviewed:     Chemistry:  Lab Results   Component Value Date     01/13/2025    K 3.9 01/13/2025    BUN 20.4 01/13/2025    CREATININE 0.73 01/13/2025    EGFRNORACEVR >60 01/13/2025    GLUCOSE 99 01/13/2025    CALCIUM 9.9 01/13/2025    ALKPHOS 57 08/19/2024    LABPROT 13.4 08/19/2024    LABPROT 8.2 (H) 08/19/2024    ALBUMIN 3.8 08/19/2024    BILIDIR 0.3 03/31/2022    IBILI 0.40 03/31/2022    AST 21 08/19/2024    ALT 25 08/19/2024    XCAZPVYO46DI 39.9 11/08/2023        Lab Results   Component Value Date    HGBA1C 4.4 05/14/2024        Hematology:  Lab Results   Component Value Date    WBC 8.37 01/13/2025    HGB 15.9 01/13/2025    HCT 46.5 01/13/2025     01/13/2025       Lipid Panel:  Lab Results   Component Value Date    CHOL 192 01/13/2025    HDL 32 (L) 01/13/2025    LDL 97.00 01/13/2025    TRIG 316 (H) 01/13/2025    TOTALCHOLEST 6 (H) 01/13/2025        Urine:  Lab Results   Component Value Date    APPEARANCEUA Clear 01/13/2025    SGUA 1.021 01/13/2025    PROTEINUA Negative 01/13/2025    KETONESUA Negative 01/13/2025    LEUKOCYTESUR 250 (A) 01/13/2025    RBCUA 0-5 01/13/2025    WBCUA 6-10 (A) 01/13/2025    BACTERIA None Seen 01/13/2025    SQEPUA Trace (A) 01/13/2025    HYALINECASTS None Seen 01/13/2025        Assessment:       ICD-10-CM ICD-9-CM   1. Liver cirrhosis secondary to ROCHA (nonalcoholic steatohepatitis)  K75.81 571.8    K74.60 571.5   2. Primary hypertension  I10 401.9   3. Asymptomatic microscopic hematuria  R31.21 599.72   4. Tobacco user  Z72.0 305.1   5. Adenomatous rectal polyp  D12.8 211.4   6. Prostate cancer screening  Z12.5 V76.44   7. Hypertriglyceridemia   E78.1 272.1   8. Well adult exam  Z00.00 V70.0     I10 401.9        Plan:     1. Liver cirrhosis secondary to ROCHA (nonalcoholic steatohepatitis) (Primary)  Pt followed in GI- Dr Olivia Cirrhosis cl. Keep appts.     2. Primary hypertension  Low fat low salt diet and exercise. Cont Lisinopril HCTZ as prescribed.     3. Asymptomatic microscopic hematuria  Resolved. Will monitor.     4. Tobacco user  Encouraged smoking cessation. Education provided.     5. Adenomatous rectal polyp  Pt last colonoscopy done 5-18-23 with 6 polyps- no malignancy- suggest repeat in 3 years due 5-2026.     6. Prostate cancer screening  PSA in 6 months.    7. Hypertriglyceridemia  Trigs 316 up from 198. Pt admits to eating high fat foods - cold cuts etc.  Low fat diet and exercise encouraged. FLP in 6 months.     8. Well adult exam  Labs in 6 months. PSA in 6 months. Pt last colonoscopy done 5-18-23 with 6 polyps- no malignancy- suggest repeat in 3 years due 5-2026.           Follow up in about 6 months (around 7/14/2025) for with labs 1 week prior to appt. . In addition to their scheduled follow up, the patient has also been instructed to follow up on as needed basis.     Future Appointments   Date Time Provider Department Center   2/24/2025  1:30 PM Jeromy Olivia MD [facility]

## 2025-02-24 ENCOUNTER — OFFICE VISIT (OUTPATIENT)
Dept: GASTROENTEROLOGY | Facility: CLINIC | Age: 65
End: 2025-02-24
Payer: COMMERCIAL

## 2025-02-24 VITALS
OXYGEN SATURATION: 98 % | TEMPERATURE: 98 F | BODY MASS INDEX: 30.7 KG/M2 | HEIGHT: 66 IN | RESPIRATION RATE: 16 BRPM | SYSTOLIC BLOOD PRESSURE: 123 MMHG | WEIGHT: 191 LBS | HEART RATE: 76 BPM | DIASTOLIC BLOOD PRESSURE: 78 MMHG

## 2025-02-24 DIAGNOSIS — K75.81 STEATOHEPATITIS, NON-ALCOHOLIC: Primary | ICD-10-CM

## 2025-02-24 LAB
ALBUMIN SERPL-MCNC: 4.1 G/DL (ref 3.4–4.8)
ALBUMIN/GLOB SERPL: 1 RATIO (ref 1.1–2)
ALP SERPL-CCNC: 35 UNIT/L (ref 40–150)
ALT SERPL-CCNC: 24 UNIT/L (ref 0–55)
ANION GAP SERPL CALC-SCNC: 12 MEQ/L
AST SERPL-CCNC: 20 UNIT/L (ref 5–34)
BILIRUB SERPL-MCNC: 0.5 MG/DL
BUN SERPL-MCNC: 19.6 MG/DL (ref 8.4–25.7)
CALCIUM SERPL-MCNC: 9.7 MG/DL (ref 8.8–10)
CHLORIDE SERPL-SCNC: 102 MMOL/L (ref 98–107)
CO2 SERPL-SCNC: 25 MMOL/L (ref 23–31)
CREAT SERPL-MCNC: 0.69 MG/DL (ref 0.72–1.25)
CREAT/UREA NIT SERPL: 28
GFR SERPLBLD CREATININE-BSD FMLA CKD-EPI: >60 ML/MIN/1.73/M2
GLOBULIN SER-MCNC: 4.3 GM/DL (ref 2.4–3.5)
GLUCOSE SERPL-MCNC: 71 MG/DL (ref 82–115)
INR PPP: 1
POTASSIUM SERPL-SCNC: 3.6 MMOL/L (ref 3.5–5.1)
PROT SERPL-MCNC: 8.4 GM/DL (ref 5.8–7.6)
PROTHROMBIN TIME: 13.4 SECONDS (ref 11.4–14)
SODIUM SERPL-SCNC: 139 MMOL/L (ref 136–145)

## 2025-02-24 PROCEDURE — 85610 PROTHROMBIN TIME: CPT | Performed by: STUDENT IN AN ORGANIZED HEALTH CARE EDUCATION/TRAINING PROGRAM

## 2025-02-24 PROCEDURE — 36415 COLL VENOUS BLD VENIPUNCTURE: CPT | Performed by: STUDENT IN AN ORGANIZED HEALTH CARE EDUCATION/TRAINING PROGRAM

## 2025-02-24 PROCEDURE — 90656 IIV3 VACC NO PRSV 0.5 ML IM: CPT | Mod: PBBFAC

## 2025-02-24 PROCEDURE — G0008 ADMIN INFLUENZA VIRUS VAC: HCPCS | Mod: PBBFAC

## 2025-02-24 PROCEDURE — 99213 OFFICE O/P EST LOW 20 MIN: CPT | Mod: PBBFAC | Performed by: STUDENT IN AN ORGANIZED HEALTH CARE EDUCATION/TRAINING PROGRAM

## 2025-02-24 PROCEDURE — 80053 COMPREHEN METABOLIC PANEL: CPT | Performed by: STUDENT IN AN ORGANIZED HEALTH CARE EDUCATION/TRAINING PROGRAM

## 2025-02-24 RX ADMIN — INFLUENZA VIRUS VACCINE 0.5 ML: 15; 15; 15 SUSPENSION INTRAMUSCULAR at 01:02

## 2025-02-24 NOTE — PROGRESS NOTES
Subjective     Patient ID: Dawson Guerra is a 64 y.o. male.    Chief Complaint: KING (No complaints)    64-year-old male with history of hypertension obesity, tobacco use and alcohol abuse presenting here to GI clinic for follow-up appointment for his cirrhosis.  Patient last seen by me November 22, 2021, at that time his cirrhotic liver disease has been well compensated, his MELD score  was 9, and his Child Neff class A. His last EGD performed June of 2021 and head revealed some small esophageal varices grade 1 in the mid and distal esophagus, nonbleeding.  His last abdominal ultrasound was February 2022 that revealed a cirrhotic appearing liver with no focal lesions present.  He has not had any hospitalizations or other ER visits for decompensation of his cirrhosis.  My recommendation at that time had been for him to have repeat EGD in 2023, and he has been avoiding alcohol since that time.       He initially was seen by our clinic after obtaining records from [facility] in January of 2020, his workup in the past has also included a liver biopsy in February of 2020 with findings of cirrhosis with mild steatosis.  The pathology did not elaborate on micro versus macrovesicular steatosis are common or need further findings other than cirrhosis.  The case has been discussed with staff here and was recommended to treat his alcohol plus/minus King cirrhosis.  Has undergone a colonoscopy in the past with Dr. Colunga November of 2019 with findings of 3 adenomatous polyps in the sigmoid colon and recommended recall of 3 years.  He does smoke 10 cigarettes daily and at that time was drinking alcohol on occasion.  He used to drink whiskey, rum and beer on the weekends for several years in the past.  Denied a family history of IBD, colon polyps or colon cancer.     5/23/22:  Denies any complaints today, denies any recent ER visits for decompensation of cirrhosis, denies any jaundice, abdominal swelling, lower extremity  swelling.  Only admits to easy bleeding with scratches or cuts of his upper extremities, I explained him likely result of thrombocytopenia from his cirrhotic liver disease.  He continues to avoid raw shellfish or NSAIDs.  Discussed his ultrasound result findings from February 2022, no focal lesions present.  He will be due for upcoming HCC screening with abdominal ultrasound August 2022.      12/19/22:  No complaints today, updated on HCC screening September this year, no masses or liver lesions present.  Will need to get updated MELD labs next month, and he is slated for EGD and colonoscopy May 2023.  Denies any melena, hematochezia, jaundice, lower extremity swelling no recent emergency room visits or hospitalizations for decompensated cirrhotic liver disease.       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  He denies any orthostatic like symptoms including feeling lightheaded or dizzy, or orthostatic when he stands up or sits up from lying down.  He states usually has to go through 1 or 2 shots per day while working.  He is also on HCTZ for blood pressure control.  Encouraged increase p.o. intake as well as hydration given her current heat wave in given his cirrhotic status can certainly lead to decompensation if he is volume depleted.  He is refrain from alcohol for nearly 36 months, and no longer smokes.  Having 2 bowel movements per day, nonbloody, no melena or hematochezia.  Current MELD is 7, Child Neff class A.  Most up-to-date imaging studies include a CT abdomen pelvis from May of this year which revealed baseline cirrhosis of the liver with no liver lesions noted.  Also up-to-date on colonoscopy performed this may, that revealed Six 2 to 5 mm polyps in the  rectum, in the  descending colon, in the ascending colon and in the cecum.  Recall in 3 years.     2/19/24:  Patient with no complaints today, has been staying well hydrated since last appointment where he had been hypotensive due to decreased water intake.  Endorses 2 bowel movements per day, nonbloody, denies any hematemesis, jaundice.  Overdue on updated MELD labs, last done in March 2023, also needs updated endoscopy last performed June of 2021.  Endorses weight gain, has been eating processed foods and high quantities, high sodium.        8/19/24:  No complaints today, endorsing 3 bowel movements per day without straining, no melena hematochezia no jaundice no hematemesis.  Only stated he recently had a muscle strain in his back when moving a ladder, denied NSAID use.  Triple phase CT had been performed 06/05/2024 which was negative for any liver lesions.  MELD labs do need updated today.  Endoscopy performed 06/03/2024 showed normal E/S/D.    Today's visit, 2/24/25:  No complaints today, recently saw primary care physician, he has made dietary modifications given his elevated triglycerides greater than 300, cutting out sweeteners, was eating large quantity of eggs cut that out as well as use of butter in his food, he plans on doing resistance training as well, as scheduled to have his lipid panel checked in a few months.  Explained to him relevant given he has steatohepatitis this can contribute to worsening of his overall hepatic steatosis down the road.  His amenable to the flu vaccine today, was very mask in the room, states several of his friends he had been coughing, upper respiratory symptoms.  Denies any melena hematochezia, jaundice, hematemesis, 2 bowel movements per day without straining.  Due for his EGD and colonoscopy repeats in 2026.  We will need MELD labs updated today.  Denies any abdominal swelling, lower extremity swelling, or scleral icterus.  HCC screening overdue.  Review of Systems    Constitutional: Negative.    HENT: Negative.     Eyes: Negative.    Respiratory: Negative.     Cardiovascular: Negative.    Gastrointestinal: Negative.    Endocrine: Negative.    Genitourinary: Negative.    Musculoskeletal: Negative.    Allergic/Immunologic: Negative.    Neurological: Negative.    Hematological: Negative.    Psychiatric/Behavioral: Negative.          Objective   Vitals:    02/24/25 1308   BP: 123/78   Pulse: 76   Resp: 16   Temp: 98.2 °F (36.8 °C)         Physical Exam  Constitutional:       Appearance: Normal appearance. He is obese.   HENT:      Head: Normocephalic and atraumatic.   Eyes:      Conjunctiva/sclera: Conjunctivae normal.      Pupils: Pupils are equal, round, and reactive to light.   Cardiovascular:      Rate and Rhythm: Normal rate and regular rhythm.      Pulses: Normal pulses.      Heart sounds: Normal heart sounds.   Pulmonary:      Effort: Pulmonary effort is normal.      Breath sounds: Normal breath sounds.   Abdominal:      General: Abdomen is flat. Bowel sounds are normal.      Palpations: Abdomen is soft.   Skin:     General: Skin is warm and dry.   Neurological:      General: No focal deficit present.      Mental Status: He is alert and oriented to person, place, and time. Mental status is at baseline.        Assessment and Plan     1. Steatohepatitis, non-alcoholic  -     Comprehensive Metabolic Panel  -     Protime-INR  -     US Abdomen Limited_Liver; Future; Expected date: 03/10/2025  -     influenza (Flulaval, Fluzone, Fluarix) 45 mcg/0.5 mL IM vaccine (> or = 6 mo) 0.5 mL        Background:  Alcohol: yes, in the past     Tobacco:  yes, in the past     Liver disease: ROCHA     MELD-Na:  Overdue by a year, updated today.  Child-Neff Class:  See for MELD     Transplant: not under evaluation, low MELD     Cirrhosis is decompensated with:     Ascites: no  Spontaneous bacterial peritonitis: No  Hepatic Encephalopathy: No  Hepatocellular carcinoma: No  Hepatorenal syndrome:  No  Hyponatremia: No  Muscle wasting: No  Portal vein thrombosis: No     Screening:  Last EGD:  Hanna 3, 2024: Normal E/S/D.        Last imaging study:  Triple phase CT abdomen pelvis, 06/05/2024: No hepatic lesions seen.     CIRRHOSIS COUNSELING:  - strict abstinence of alcohol use  - low sodium (salt) 2000mg per day  - Nutrition: 25-30kcal (calorie per body weight in kilogram) per day  - No need to restrict protein in diet  - High protein diet: 1.2-1.5 gram/kg (protein per body weight in kg) per day to prevent muscle mass loss  - Resistance exercises for muscle strength  - Avoid raw seafoods due to risk of fatal Vibrio vulnificus infection  - Avoid Non-steroidal anti-inflammatory drugs (NSAIDs) such as ibuprofen, Motrin, naproxen, Aleve due to risk of kidney damage  - Can take acetaminophen (tylenol), no more than 2000mg per day  - Compliance with all medications  - Ultrasound or MRI of the liver every 6 months for liver cancer screening  - Upper endoscopy every 1-2 years to screen for varices     6 month followup.  HCC screening to be updated the next 2 weeks, with a abdominal ultrasound, MELD labs to be updated today, administering flu vaccine today as well.    Both EGD and colonoscopy will be due in 2026.    Continue follow up with PCP for management of his hypertriglyceridemia which significant.    Continue to abstain from alcohol and cigarettes,     Labs returned.  MELD 6, Child Neff 5A.    Follow up in about 6 months (around 8/24/2025).

## 2025-03-06 ENCOUNTER — HOSPITAL ENCOUNTER (OUTPATIENT)
Dept: RADIOLOGY | Facility: HOSPITAL | Age: 65
Discharge: HOME | End: 2025-03-06
Attending: STUDENT IN AN ORGANIZED HEALTH CARE EDUCATION/TRAINING PROGRAM
Payer: COMMERCIAL

## 2025-03-06 DIAGNOSIS — K75.81 STEATOHEPATITIS, NON-ALCOHOLIC: ICD-10-CM

## 2025-03-06 PROCEDURE — 76705 ECHO EXAM OF ABDOMEN: CPT | Mod: TC

## 2025-07-14 ENCOUNTER — LAB VISIT (OUTPATIENT)
Dept: LAB | Facility: HOSPITAL | Age: 65
End: 2025-07-14
Attending: NURSE PRACTITIONER
Payer: COMMERCIAL

## 2025-07-14 DIAGNOSIS — Z12.5 PROSTATE CANCER SCREENING: ICD-10-CM

## 2025-07-14 DIAGNOSIS — E78.1 HYPERTRIGLYCERIDEMIA: ICD-10-CM

## 2025-07-14 DIAGNOSIS — I10 PRIMARY HYPERTENSION: ICD-10-CM

## 2025-07-14 LAB
CHOLEST SERPL-MCNC: 180 MG/DL
CHOLEST/HDLC SERPL: 6 {RATIO} (ref 0–5)
HDLC SERPL-MCNC: 30 MG/DL (ref 35–60)
LDLC SERPL CALC-MCNC: 108 MG/DL (ref 50–140)
PSA SERPL-MCNC: 1.08 NG/ML
TRIGL SERPL-MCNC: 210 MG/DL (ref 34–140)
TSH SERPL-ACNC: 1.29 UIU/ML (ref 0.35–4.94)
VLDLC SERPL CALC-MCNC: 42 MG/DL

## 2025-07-14 PROCEDURE — 80061 LIPID PANEL: CPT

## 2025-07-14 PROCEDURE — 36415 COLL VENOUS BLD VENIPUNCTURE: CPT

## 2025-07-14 PROCEDURE — 84443 ASSAY THYROID STIM HORMONE: CPT

## 2025-07-14 PROCEDURE — 84153 ASSAY OF PSA TOTAL: CPT

## 2025-07-15 ENCOUNTER — OFFICE VISIT (OUTPATIENT)
Dept: INTERNAL MEDICINE | Facility: CLINIC | Age: 65
End: 2025-07-15
Payer: COMMERCIAL

## 2025-07-15 VITALS
WEIGHT: 181.31 LBS | DIASTOLIC BLOOD PRESSURE: 80 MMHG | TEMPERATURE: 98 F | HEART RATE: 76 BPM | BODY MASS INDEX: 29.14 KG/M2 | RESPIRATION RATE: 16 BRPM | HEIGHT: 66 IN | SYSTOLIC BLOOD PRESSURE: 120 MMHG

## 2025-07-15 DIAGNOSIS — Z72.0 TOBACCO USER: ICD-10-CM

## 2025-07-15 DIAGNOSIS — K75.81 LIVER CIRRHOSIS SECONDARY TO NASH (NONALCOHOLIC STEATOHEPATITIS): Primary | ICD-10-CM

## 2025-07-15 DIAGNOSIS — K74.60 LIVER CIRRHOSIS SECONDARY TO NASH (NONALCOHOLIC STEATOHEPATITIS): Primary | ICD-10-CM

## 2025-07-15 DIAGNOSIS — I10 HYPERTENSION, UNSPECIFIED TYPE: ICD-10-CM

## 2025-07-15 DIAGNOSIS — I10 PRIMARY HYPERTENSION: ICD-10-CM

## 2025-07-15 DIAGNOSIS — E78.1 HYPERTRIGLYCERIDEMIA: ICD-10-CM

## 2025-07-15 PROCEDURE — 4010F ACE/ARB THERAPY RXD/TAKEN: CPT | Mod: CPTII,,, | Performed by: NURSE PRACTITIONER

## 2025-07-15 PROCEDURE — 3008F BODY MASS INDEX DOCD: CPT | Mod: CPTII,,, | Performed by: NURSE PRACTITIONER

## 2025-07-15 PROCEDURE — 99214 OFFICE O/P EST MOD 30 MIN: CPT | Mod: S$PBB,,, | Performed by: NURSE PRACTITIONER

## 2025-07-15 PROCEDURE — 1159F MED LIST DOCD IN RCRD: CPT | Mod: CPTII,,, | Performed by: NURSE PRACTITIONER

## 2025-07-15 PROCEDURE — 3079F DIAST BP 80-89 MM HG: CPT | Mod: CPTII,,, | Performed by: NURSE PRACTITIONER

## 2025-07-15 PROCEDURE — 1160F RVW MEDS BY RX/DR IN RCRD: CPT | Mod: CPTII,,, | Performed by: NURSE PRACTITIONER

## 2025-07-15 PROCEDURE — 99213 OFFICE O/P EST LOW 20 MIN: CPT | Mod: PBBFAC | Performed by: NURSE PRACTITIONER

## 2025-07-15 PROCEDURE — 3074F SYST BP LT 130 MM HG: CPT | Mod: CPTII,,, | Performed by: NURSE PRACTITIONER

## 2025-07-15 RX ORDER — LISINOPRIL AND HYDROCHLOROTHIAZIDE 20; 25 MG/1; MG/1
1 TABLET ORAL DAILY
Qty: 90 TABLET | Refills: 1 | Status: SHIPPED | OUTPATIENT
Start: 2025-07-15 | End: 2026-01-11

## 2025-07-15 NOTE — PROGRESS NOTES
Patient ID: [id number]     Chief Complaint: Follow-up (Lab review )        HPI:     HPI      Dawson Guerra is a 65 y.o. male here today for a follow up.         Immunizations:   Immunization History   Administered Date(s) Administered    COVID-19, MRNA, LN-S, PF (Pfizer) (Purple Cap) 03/17/2021, 04/07/2021, 11/09/2021    Influenza - Quadrivalent 10/14/2021    Influenza - Quadrivalent - PF *Preferred* (6 months and older) 01/31/2018, 10/14/2021    Influenza - Trivalent - Fluarix, Flulaval, Fluzone, Afluria - PF 01/31/2018, 02/24/2025    Tdap 07/12/2021        -------------------------------------    Elevated liver enzymes    Hypertension    Microhematuria    Positive FIT (fecal immunochemical test)    Prediabetes    Tobacco user    Unspecified cirrhosis of liver        Past Surgical History:   Procedure Laterality Date    COLONOSCOPY      COLONOSCOPY, WITH POLYPECTOMY USING SNARE N/A 5/18/2023    Procedure: COLONOSCOPY, WITH POLYPECTOMY USING SNARE;  Surgeon: Saravanan Andersen MD;  Location: [facility] ENDOSCOPY;  Service: Endoscopy;  Laterality: N/A;    ESOPHAGOGASTRODUODENOSCOPY      ESOPHAGOGASTRODUODENOSCOPY N/A 6/3/2024    Procedure: EGD (ESOPHAGOGASTRODUODENOSCOPY);  Surgeon: Rocio Jacob MD;  Location: [facility] ENDOSCOPY;  Service: Gastroenterology;  Laterality: N/A;    LIVER BIOPSY      POLYPECTOMY      TENDON REPAIR Right     foot       Review of patient's allergies indicates:  No Known Allergies    Current Outpatient Medications   Medication Instructions    ascorbic acid (vitamin C) (VITAMIN C) 100 mg, Daily    aspirin (ECOTRIN) 81 mg, Every other day    lisinopriL-hydrochlorothiazide (PRINZIDE,ZESTORETIC) 20-25 mg Tab 1 tablet, Oral, Daily    multivitamin with iron Tab 1 tablet, Daily    omega-3 fatty acids/fish oil (FISH OIL-OMEGA-3 FATTY ACIDS) 300-1,000 mg capsule 1 capsule, Daily       Social History[1]     Family History   Problem Relation Name Age of Onset    Lung cancer Mother      Stroke  "Father      Dementia Father      Bladder Cancer Father      Liver cancer Brother          Patient Care Team:  Sanjuanita Colunga FNP as PCP - General (Family Medicine)     Subjective:     Review of Systems     See HPI for details    Constitutional: Denies Change in appetite. Denies Chills. Denies Fever. Denies Night sweats.  Eye: Denies Blurred vision. Denies Discharge. Denies Eye pain.  ENT: Denies Decreased hearing. Denies Sore throat. Denies Swollen glands.  Respiratory: Denies Cough. Denies Shortness of breath. Denies Shortness of breath with exertion. Denies Wheezing.  Cardiovascular: DeniesChest pain at rest. Denies Chest pain with exertion. Denies Irregular heartbeat. Denies Palpitations. Denies Edema.  Gastrointestinal: Denies Abdominal pain. DeniesDiarrhea. Denies Nausea. Denies Vomiting. Denies Hematemesis or Hematochezia.  Genitourinary: Denies Dysuria. Denies Urinary frequency. Denies Urinary urgency. Denies Blood in urine.  Endocrine: Denies Cold intolerance. Denies Excessive thirst. Denies Heat intolerance. Denies Weight loss. Denies Weight gain.  Musculoskeletal: Denies Painful joints. Denies Weakness.  Integumentary: Denies Rash. Denies Itching. Denies Dry skin.  Neurologic: Denies Dizziness. Denies Fainting. Denies Headache.  Psychiatric: Denies Depression. Denies Anxiety. Denies Suicidal/Homicidal ideations.    All Other ROS: Negative except as stated in HPI.       Objective:     Visit Vitals  /80 (BP Location: Right arm, Patient Position: Sitting)   Pulse 76   Temp 97.5 °F (36.4 °C)   Resp 16   Ht 5' 6" (1.676 m)   Wt 82.2 kg (181 lb 4.8 oz)   BMI 29.26 kg/m²       Physical Exam    General: Alert and oriented, No acute distress.  Head: Normocephalic, Atraumatic.  Eye: Pupils are equal, round and reactive to light, Extraocular movements are intact, Sclera non-icteric.  Ears/Nose/Throat: Normal, Mucosa moist,Clear.  Neck/Thyroid: Supple, Non-tender, No carotid bruit, No lymphadenopathy, No " JVD, Full range of motion.  Respiratory: Clear to auscultation bilaterally; No wheezes, rales or rhonchi,Non-labored respirations, Symmetrical chest wall expansion.  Cardiovascular: Regular rate and rhythm, S1/S2 normal, No murmurs, rubs or gallops.  Gastrointestinal: Soft, Non-tender, Non-distended, Normal bowel sounds, No palpable organomegaly.  Musculoskeletal: Normal range of motion.  Integumentary: Warm, Dry, Intact, No suspicious lesions or rashes.  Extremities: No clubbing, cyanosis or edema  Neurologic: No focal deficits, Cranial Nerves II-XII are grossly intact, Motor strength normal upper and lower extremities, Sensory exam intact.  Psychiatric: Normal interaction, Coherent speech, Euthymic mood, Appropriate affect       Labs Reviewed:     Chemistry:  Lab Results   Component Value Date     02/24/2025    K 3.6 02/24/2025    BUN 19.6 02/24/2025    CREATININE 0.69 (L) 02/24/2025    EGFRNORACEVR >60 02/24/2025    CALCIUM 9.7 02/24/2025    ALKPHOS 35 (L) 02/24/2025    ALBUMIN 4.1 02/24/2025    BILIDIR 0.3 03/31/2022    IBILI 0.40 03/31/2022    AST 20 02/24/2025    ALT 24 02/24/2025    KAWIACDF22IG 39.9 11/08/2023        Lab Results   Component Value Date    HGBA1C 4.4 05/14/2024        Hematology:  Lab Results   Component Value Date    WBC 8.37 01/13/2025    HGB 15.9 01/13/2025    HCT 46.5 01/13/2025     01/13/2025       Lipid Panel:  Lab Results   Component Value Date    CHOL 180 07/14/2025    HDL 30 (L) 07/14/2025    .00 07/14/2025    TRIG 210 (H) 07/14/2025    TOTALCHOLEST 6 (H) 07/14/2025        Urine:  Lab Results   Component Value Date    APPEARANCEUA Clear 01/13/2025    SGUA 1.021 01/13/2025    PROTEINUA Negative 01/13/2025    KETONESUA Negative 01/13/2025    LEUKOCYTESUR 250 (A) 01/13/2025    RBCUA 0-5 01/13/2025    WBCUA 6-10 (A) 01/13/2025    BACTERIA None Seen 01/13/2025    SQEPUA Trace (A) 01/13/2025    HYALINECASTS None Seen 01/13/2025        Assessment:       ICD-10-CM ICD-9-CM    1. Liver cirrhosis secondary to ROCHA (nonalcoholic steatohepatitis)  K75.81 571.8    K74.60 571.5   2. Primary hypertension  I10 401.9   3. Hypertriglyceridemia  E78.1 272.1   4. Tobacco user  Z72.0 305.1        Plan:     1. Liver cirrhosis secondary to ROCHA (nonalcoholic steatohepatitis) (Primary)  Pt followed in GI with Dr Olivia. Keep appts.     2. Primary hypertension  BP controlled. Low fat low salt diet and exercise. Cont Lisinopril HCTZ as prescribed.     3. Hypertriglyceridemia  Improvement noted. Pt cut out all sugar in his diet. Low fat diet and exercise. FLP in 6 months.    4. Tobacco user  Encouraged smoking cessation. Education provided. Offered LDCT- pt refused due to cost and does not want to know the outcome.          Follow up in about 6 months (around 1/15/2026) for with labs 1 week prior to appt. . In addition to their scheduled follow up, the patient has also been instructed to follow up on as needed basis.     Future Appointments   Date Time Provider Department Center   2025  1:00 PM Jeromy Olivia MD [facility]             [1]   Social History  Socioeconomic History    Marital status: Single   Tobacco Use    Smoking status: Former     Current packs/day: 0.00     Average packs/day: 0.5 packs/day for 48.7 years (24.3 ttl pk-yrs)     Types: Cigarettes     Start date:      Quit date: 2023     Years since quittin.8    Smokeless tobacco: Never    Tobacco comments:     Quit 1 month ago   Substance and Sexual Activity    Alcohol use: Not Currently    Drug use: Never    Sexual activity: Not Currently     Social Drivers of Health     Financial Resource Strain: Medium Risk (2022)    Overall Financial Resource Strain (CARDIA)     Difficulty of Paying Living Expenses: Somewhat hard   Food Insecurity: No Food Insecurity (2022)    Hunger Vital Sign     Worried About Running Out of Food in the Last Year: Never true     Ran Out of Food in  the Last Year: Never true   Transportation Needs: No Transportation Needs (9/26/2022)    PRAPARE - Transportation     Lack of Transportation (Medical): No     Lack of Transportation (Non-Medical): No   Physical Activity: Insufficiently Active (9/26/2022)    Exercise Vital Sign     Days of Exercise per Week: 2 days     Minutes of Exercise per Session: 60 min   Stress: Stress Concern Present (9/26/2022)    [employer] - Occupational Stress Questionnaire     Feeling of Stress : Rather much   Housing Stability: Low Risk  (9/26/2022)    Housing Stability Vital Sign     Unable to Pay for Housing in the Last Year: No     Number of Places Lived in the Last Year: 1     Unstable Housing in the Last Year: No

## 2025-08-25 ENCOUNTER — OFFICE VISIT (OUTPATIENT)
Dept: GASTROENTEROLOGY | Facility: CLINIC | Age: 65
End: 2025-08-25
Payer: COMMERCIAL

## 2025-08-25 ENCOUNTER — LAB VISIT (OUTPATIENT)
Dept: LAB | Facility: HOSPITAL | Age: 65
End: 2025-08-25
Attending: STUDENT IN AN ORGANIZED HEALTH CARE EDUCATION/TRAINING PROGRAM
Payer: COMMERCIAL

## 2025-08-25 VITALS
DIASTOLIC BLOOD PRESSURE: 67 MMHG | SYSTOLIC BLOOD PRESSURE: 130 MMHG | HEART RATE: 75 BPM | WEIGHT: 183 LBS | RESPIRATION RATE: 12 BRPM | HEIGHT: 66 IN | TEMPERATURE: 99 F | BODY MASS INDEX: 29.41 KG/M2

## 2025-08-25 DIAGNOSIS — K75.81 METABOLIC DYSFUNCTION-ASSOCIATED STEATOHEPATITIS (MASH): ICD-10-CM

## 2025-08-25 DIAGNOSIS — K75.81 METABOLIC DYSFUNCTION-ASSOCIATED STEATOHEPATITIS (MASH): Primary | ICD-10-CM

## 2025-08-25 LAB
ALBUMIN SERPL-MCNC: 3.7 G/DL (ref 3.4–4.8)
ALBUMIN/GLOB SERPL: 0.8 RATIO (ref 1.1–2)
ALP SERPL-CCNC: 40 UNIT/L (ref 40–150)
ALT SERPL-CCNC: 20 UNIT/L (ref 0–55)
ANION GAP SERPL CALC-SCNC: 7 MEQ/L
AST SERPL-CCNC: 18 UNIT/L (ref 11–45)
BILIRUB SERPL-MCNC: 0.7 MG/DL
BUN SERPL-MCNC: 19.2 MG/DL (ref 8.4–25.7)
CALCIUM SERPL-MCNC: 9.7 MG/DL (ref 8.8–10)
CHLORIDE SERPL-SCNC: 103 MMOL/L (ref 98–107)
CO2 SERPL-SCNC: 28 MMOL/L (ref 23–31)
CREAT SERPL-MCNC: 0.77 MG/DL (ref 0.72–1.25)
CREAT/UREA NIT SERPL: 25
GFR SERPLBLD CREATININE-BSD FMLA CKD-EPI: >60 ML/MIN/1.73/M2
GLOBULIN SER-MCNC: 4.7 GM/DL (ref 2.4–3.5)
GLUCOSE SERPL-MCNC: 84 MG/DL (ref 82–115)
INR PPP: 1.1
POTASSIUM SERPL-SCNC: 4 MMOL/L (ref 3.5–5.1)
PROT SERPL-MCNC: 8.4 GM/DL (ref 5.8–7.6)
PROTHROMBIN TIME: 13.8 SECONDS (ref 11.4–14)
SODIUM SERPL-SCNC: 138 MMOL/L (ref 136–145)

## 2025-08-25 PROCEDURE — 99213 OFFICE O/P EST LOW 20 MIN: CPT | Mod: PBBFAC | Performed by: STUDENT IN AN ORGANIZED HEALTH CARE EDUCATION/TRAINING PROGRAM

## 2025-08-25 PROCEDURE — 80053 COMPREHEN METABOLIC PANEL: CPT

## 2025-08-25 PROCEDURE — 85610 PROTHROMBIN TIME: CPT

## 2025-08-25 PROCEDURE — 36415 COLL VENOUS BLD VENIPUNCTURE: CPT

## 2025-08-29 DIAGNOSIS — K75.81 LIVER CIRRHOSIS SECONDARY TO NASH (NONALCOHOLIC STEATOHEPATITIS): Primary | ICD-10-CM

## 2025-08-29 DIAGNOSIS — Z86.0100 HISTORY OF COLON POLYPS: ICD-10-CM

## 2025-08-29 DIAGNOSIS — K74.60 LIVER CIRRHOSIS SECONDARY TO NASH (NONALCOHOLIC STEATOHEPATITIS): Primary | ICD-10-CM

## (undated) DEVICE — SNARE EXACTO COLD

## (undated) DEVICE — SOL IRRI STRL WATER 1000ML

## (undated) DEVICE — MANIFOLD 4 PORT

## (undated) DEVICE — KIT SURGICAL COLON .25 1.1OZ

## (undated) DEVICE — MOUTHPIECE ENDO 60FR